# Patient Record
Sex: MALE | Race: WHITE | NOT HISPANIC OR LATINO | Employment: FULL TIME | URBAN - METROPOLITAN AREA
[De-identification: names, ages, dates, MRNs, and addresses within clinical notes are randomized per-mention and may not be internally consistent; named-entity substitution may affect disease eponyms.]

---

## 2021-04-08 DIAGNOSIS — Z23 ENCOUNTER FOR IMMUNIZATION: ICD-10-CM

## 2021-04-26 ENCOUNTER — IMMUNIZATIONS (OUTPATIENT)
Dept: FAMILY MEDICINE CLINIC | Facility: HOSPITAL | Age: 46
End: 2021-04-26

## 2021-04-26 DIAGNOSIS — Z23 ENCOUNTER FOR IMMUNIZATION: Primary | ICD-10-CM

## 2021-04-26 PROCEDURE — 0011A SARS-COV-2 / COVID-19 MRNA VACCINE (MODERNA) 100 MCG: CPT

## 2021-04-26 PROCEDURE — 91301 SARS-COV-2 / COVID-19 MRNA VACCINE (MODERNA) 100 MCG: CPT

## 2021-05-06 ENCOUNTER — OFFICE VISIT (OUTPATIENT)
Dept: FAMILY MEDICINE CLINIC | Facility: CLINIC | Age: 46
End: 2021-05-06
Payer: COMMERCIAL

## 2021-05-06 VITALS
OXYGEN SATURATION: 99 % | SYSTOLIC BLOOD PRESSURE: 128 MMHG | HEART RATE: 64 BPM | BODY MASS INDEX: 26.67 KG/M2 | HEIGHT: 68 IN | WEIGHT: 176 LBS | RESPIRATION RATE: 18 BRPM | DIASTOLIC BLOOD PRESSURE: 70 MMHG

## 2021-05-06 DIAGNOSIS — M54.12 CERVICAL RADICULOPATHY: Primary | ICD-10-CM

## 2021-05-06 DIAGNOSIS — M25.512 ACUTE PAIN OF LEFT SHOULDER: ICD-10-CM

## 2021-05-06 PROCEDURE — 99204 OFFICE O/P NEW MOD 45 MIN: CPT | Performed by: FAMILY MEDICINE

## 2021-05-06 PROCEDURE — 3725F SCREEN DEPRESSION PERFORMED: CPT | Performed by: FAMILY MEDICINE

## 2021-05-06 RX ORDER — METHYLPREDNISOLONE 4 MG/1
TABLET ORAL
Qty: 21 EACH | Refills: 0 | Status: SHIPPED | OUTPATIENT
Start: 2021-05-06 | End: 2021-06-01

## 2021-05-06 NOTE — PATIENT INSTRUCTIONS
Medrol dose quyen with food  Avoid taking motrin like products (ibuprofen) or aspirin while taking  Can still use CBD cream and take tylenol if needed for additional pain relief  Can try ice or heat - whichever provides more relief    Try to avoid a lot of overhead reaching/heavy lifting  Physical therapy  If not improving - will need to consider MRI    Return for full physical exam

## 2021-05-06 NOTE — PROGRESS NOTES
FAMILY PRACTICE OFFICE VISIT    NAME: Cayla Campbell    AGE: 54 y o  SEX: male  : 1975   MRN: [id number]    DATE: 2021  TIME: 8:59 AM    Assessment and Plan   1  Cervical radiculopathy  Suspect due to overuse/repetitive injuries over time  Trial of medrol dose quyen  Take with food  Avoid taking with nsaids or asa  Pt denies h/o GI bleed or ulcer  Avoid overhead lifting/excessive yardwork/shoveling,       2  Acute pain of left shoulder  Unsure if pain radiating from neck vs isolated shoulder issue  Suspect that pt may have element of underlying DJD   And may need to further address in future if not improving - with imaging/PT,    and possible steroid injections  Return for routine PE    PHQ-9 score of 8      AVS:  Medrol dose quyen with food  Avoid taking motrin like products (ibuprofen) or aspirin while taking  Can still use CBD cream and take tylenol if needed for additional pain relief  Can try ice or heat - whichever provides more relief  Try to avoid a lot of overhead reaching/heavy lifting  Physical therapy  If not improving - will need to consider MRI    Return for full physical exam      Chief Complaint     Chief Complaint   Patient presents with    Neck Pain       History of Present Illness   Cayla Campbell is a 54y o -year-old male who presents today as a new/'old' former patient of practice  Does not routinely see PCP  Here today for acute problem  Pt has neck pain - for a 'long time'   Works for an  and does a lot of overhead work  Also played ice hockey X 20 years  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    H/o recurrent head/neck injuries over the years   Occasionally takes motrin - taking 1 pill the last couple of days only;  and heating pad; also used some CBD cream which did help a little  Most recently - pt's neck flared up due to shoveling snow and then did settle a little    Also has left shoulder pain  Had covid vaccine into left deltoid last week - and that seemed to flare left upper arm/shoulder  Review of Systems   Review of Systems   Constitutional: Negative for fever  Respiratory: Negative for cough, shortness of breath and wheezing  Cardiovascular: Negative for chest pain and palpitations  Musculoskeletal: Positive for neck pain  Neck pain and pain in left upper arm/shoulder - feels like 'knots' in shoulder  Neck pain described as 'aching' and spasm     Neurological: Negative for dizziness  Right handed   Pt feels some weakness and tingling int left arm and into left 4-5th fingers  Pt does feel headaches related to neck pain         Active Problem List   There is no problem list on file for this patient  Past Medical History:  History reviewed  No pertinent past medical history  Past Surgical History:  History reviewed  No pertinent surgical history  Family History:  History reviewed  No pertinent family history  Social History:  Social History     Socioeconomic History    Marital status: /Civil Union     Spouse name: Not on file    Number of children: Not on file    Years of education: Not on file    Highest education level: Not on file   Occupational History    Not on file   Social Needs    Financial resource strain: Not on file    Food insecurity     Worry: Not on file     Inability: Not on file   [employer] needs     Medical: Not on file     Non-medical: Not on file   Tobacco Use    Smoking status: Former Smoker     Years: 10 00     Types: Cigarettes    Smokeless tobacco: Never Used   Substance and Sexual Activity    Alcohol use:  Yes    Drug use: Yes     Types: Marijuana    Sexual activity: Yes   Lifestyle    Physical activity     Days per week: Not on file     Minutes per session: Not on file    Stress: Not on file   Relationships    Social connections     Talks on phone: Not on file     Gets together: Not on file     Attends Confucianist service: Not on file     Active member of club or organization: Not on file     Attends meetings of clubs or organizations: Not on file     Relationship status: Not on file    Intimate partner violence     Fear of current or ex partner: Not on file     Emotionally abused: Not on file     Physically abused: Not on file     Forced sexual activity: Not on file   Other Topics Concern    Not on file   Social History Narrative    Not on file       Objective     Vitals:    05/06/21 0853   BP: 128/70   Pulse: 64   Resp: 18   SpO2: 99%     Wt Readings from Last 3 Encounters:   05/06/21 79 8 kg (176 lb)   10/29/14 77 6 kg (171 lb)   03/29/13 75 9 kg (167 lb 4 oz)       Physical Exam  Vitals signs and nursing note reviewed  Constitutional:       Appearance: Normal appearance  Eyes:      Extraocular Movements: Extraocular movements intact  Pupils: Pupils are equal, round, and reactive to light  Cardiovascular:      Rate and Rhythm: Normal rate and regular rhythm  Pulses: Normal pulses  Heart sounds: No murmur  Pulmonary:      Effort: Pulmonary effort is normal  No respiratory distress  Breath sounds: Normal breath sounds  No wheezing, rhonchi or rales  Musculoskeletal:      Comments: Decreased ROM cspine - rotation and sidebending due to discomfort  Slight increased muscle tension along left trapezius musculature  No asymmetry noted with shoulders/scapula  (-) empty can sign     Neurological:      General: No focal deficit present  Mental Status: He is alert and oriented to person, place, and time  Cranial Nerves: No cranial nerve deficit  Motor: No weakness  Comments: Sensory b/l upper ext's intact  Motor equal and normal (+)5/5 b/l upper ext's  (-) tinnels     Psychiatric:         Mood and Affect: Mood normal          Behavior: Behavior normal          Thought Content:  Thought content normal          Judgment: Judgment normal          Pertinent Laboratory/Diagnostic Studies:  No results found for: GLUCOSE, BUN, CREATININE, CALCIUM, NA, K, CO2, CL  No results found for: ALT, AST, GGT, ALKPHOS, BILITOT    No results found for: WBC, HGB, HCT, MCV, PLT    No results found for: TSH    No results found for: CHOL  No results found for: TRIG  No results found for: HDL  No results found for: LDLCALC  No results found for: HGBA1C    No results found for this or any previous visit  No orders of the defined types were placed in this encounter  ALLERGIES:  Allergies   Allergen Reactions    Bee Venom Hives       Current Medications     No current outpatient medications on file  No current facility-administered medications for this visit  Health Maintenance     Health Maintenance   Topic Date Due    HIV Screening  Never done    BMI: Followup Plan  Never done    Annual Physical  Never done    DTaP,Tdap,and Td Vaccines (1 - Tdap) Never done    COVID-19 Vaccine (2 - Moderna 2-dose series) 05/24/2021    Influenza Vaccine (Season Ended) 09/01/2021    Depression Screening PHQ  05/06/2022    BMI: Adult  05/06/2022    Pneumococcal Vaccine: Pediatrics (0 to 5 Years) and At-Risk Patients (6 to 59 Years)  Aged Out    HIB Vaccine  Aged Out    Hepatitis B Vaccine  Aged Out    IPV Vaccine  Aged Out    Hepatitis A Vaccine  Aged Out    Meningococcal ACWY Vaccine  Aged Out    HPV Vaccine  Aged [employer] History   Administered Date(s) Administered    SARS-CoV-2 / COVID-19 mRNA IM ([facility]) 04/26/2021     BMI Counseling: Body mass index is 26 76 kg/m²  The BMI is above normal  Nutrition recommendations include decreasing portion sizes, encouraging healthy choices of fruits and vegetables, decreasing fast food intake, consuming healthier snacks, limiting drinks that contain sugar, moderation in carbohydrate intake, increasing intake of lean protein, reducing intake of saturated and trans fat and reducing intake of cholesterol  Exercise recommendations include exercising 3-5 times per week  No pharmacotherapy was ordered  Marcos Carey, DO

## 2021-05-13 ENCOUNTER — EVALUATION (OUTPATIENT)
Dept: PHYSICAL THERAPY | Facility: MEDICAL CENTER | Age: 46
End: 2021-05-13
Payer: COMMERCIAL

## 2021-05-13 DIAGNOSIS — M54.12 CERVICAL RADICULOPATHY: ICD-10-CM

## 2021-05-13 DIAGNOSIS — M25.512 ACUTE PAIN OF LEFT SHOULDER: ICD-10-CM

## 2021-05-13 PROCEDURE — 97162 PT EVAL MOD COMPLEX 30 MIN: CPT | Performed by: PHYSICAL THERAPIST

## 2021-05-13 NOTE — PROGRESS NOTES
PT Evaluation     Today's date: 2021  Patient name: Genna Corona  : 1975  MRN: [id number]  Referring provider: Julio Cesar Mason DO  Dx:   Encounter Diagnosis     ICD-10-CM    1  Cervical radiculopathy  M54 12 Ambulatory referral to Physical Therapy   2  Acute pain of left shoulder  M25 512 Ambulatory referral to Physical Therapy                  Assessment/Plan  Patient is a very pleasant 53 yo male who presents with symptoms of pain and difficulty with cervical motion  Patient demonstrates hypomobility throughout cervical spine coupled with chronic whiplash injuries and poor motor control  Patient will benefit from skilled Physical therapy services to address issues of pain and return to normal function and higher level athletics (hockey)  Patient should be seen 2x a week for 4-6 weeks  Subjective  Patient states that he has been having issues with working overhead as he is an   He would like to play hockey again but his neck is always bothering him  Notes that things worsened this winter with snow shoveling and then weed whacking more recently  Pain feels like it is deep in the neck and clicking popping cracking sensations are irritating  Patient notes that he has had issues with his left shoulder as well  Has taking much abuse to his body over the years with hockey  Objective  Red Flags:none  Pain: 4-9/10  Reflexes: 2+BUE  Posture:poor posturing forward head rounded shoulders  Excessive upper cervical extension  AROM:liminted in all cervical motion by 50-65%  No motion recreated left arm pain  PROM: limited in all planes by 50%  PAROM: comparable sign with UPA of C2-5 left  Diminished motion throughout cervical spine B  Crepitus present   Palpation: TTP of cervical musculature on left      Special Tests: - distraction, - ULTT, - compression, + cervical flexion rotation test left, no changes in sensation  Coordination of Movement/strengthe:Patient demonstrates poor activation of Longus Capitus and Longus Coli with loss of feed forward mechanism with reaching tasks  Patient was able to perform activation with cueing  Goals  - Pt I with initial HEP in 1-2 visits  - Improve ROM equal to contralateral side in 4 weeks  - Improved Longus Coli and Longus Capitus activation and return of feed forward mechanism   - Increase Functional Status Measure measured by FOTO by [facility]  - return to overhead work with 75% less difficulty reportedly              Precautions: none

## 2021-05-18 ENCOUNTER — OFFICE VISIT (OUTPATIENT)
Dept: PHYSICAL THERAPY | Facility: MEDICAL CENTER | Age: 46
End: 2021-05-18
Payer: COMMERCIAL

## 2021-05-18 DIAGNOSIS — M25.512 ACUTE PAIN OF LEFT SHOULDER: ICD-10-CM

## 2021-05-18 DIAGNOSIS — M54.12 CERVICAL RADICULOPATHY: Primary | ICD-10-CM

## 2021-05-18 PROCEDURE — 97140 MANUAL THERAPY 1/> REGIONS: CPT | Performed by: PHYSICAL THERAPIST

## 2021-05-18 PROCEDURE — 97110 THERAPEUTIC EXERCISES: CPT | Performed by: PHYSICAL THERAPIST

## 2021-05-18 PROCEDURE — 97010 HOT OR COLD PACKS THERAPY: CPT | Performed by: PHYSICAL THERAPIST

## 2021-05-24 ENCOUNTER — OFFICE VISIT (OUTPATIENT)
Dept: PHYSICAL THERAPY | Facility: MEDICAL CENTER | Age: 46
End: 2021-05-24
Payer: COMMERCIAL

## 2021-05-24 DIAGNOSIS — M54.12 CERVICAL RADICULOPATHY: Primary | ICD-10-CM

## 2021-05-24 DIAGNOSIS — M25.512 ACUTE PAIN OF LEFT SHOULDER: ICD-10-CM

## 2021-05-24 PROCEDURE — 97140 MANUAL THERAPY 1/> REGIONS: CPT | Performed by: PHYSICAL THERAPIST

## 2021-05-24 PROCEDURE — 97010 HOT OR COLD PACKS THERAPY: CPT | Performed by: PHYSICAL THERAPIST

## 2021-05-24 NOTE — PROGRESS NOTES
Daily Note     Today's date: 2021  Patient name: Cayla Campebll  : 1975  MRN: [id number]  Referring provider: Davis Abernathy DO  Dx:   Encounter Diagnosis     ICD-10-CM    1  Cervical radiculopathy  M54 12    2  Acute pain of left shoulder  M25 512                   Subjective: patient states that he has been feeling worse and blames his activity level and work  Patient notes that his headaches and neck pain are limiting him in all his function and work  Objective: See treatment diary below      Assessment: patient was performing his HEP reportedly however the combination of his active job and trying to do his HEP has worsened his neck pain and headaches  Patient demonstrates restriction in movement in his cervical spine as well as poor posturing however symptoms are ambiguus  Patient's pain does not seem to match movement patterns and may not be orthopedic in nature  Patient should continue with treatment for the time being to see if change can be made  If not suggested that patient return to PCP for further work up  Plan: Continue per plan of care  Precautions: none  Daily Treatment Diary     Manual              C2-5 UPA left and right Gr  Iv -  10sec x5            C3-6 rotational mob left Gr   Iv-   10sec x5            STM upper trap 5 min                                          Exercise Diary              Heat supine 10min            pec stretch             No monnies             Shoulder extension             Scapular retractions             Horizontal abduction             Chin tucks             Thoracic extension over foam roller             Foam roller on wall

## 2021-05-28 ENCOUNTER — OFFICE VISIT (OUTPATIENT)
Dept: PHYSICAL THERAPY | Facility: MEDICAL CENTER | Age: 46
End: 2021-05-28
Payer: COMMERCIAL

## 2021-05-28 DIAGNOSIS — M54.12 CERVICAL RADICULOPATHY: Primary | ICD-10-CM

## 2021-05-28 DIAGNOSIS — M25.512 ACUTE PAIN OF LEFT SHOULDER: ICD-10-CM

## 2021-05-28 NOTE — PROGRESS NOTES
Patient arrived at clinic today reporting symptoms worsening again this week  Episodes of pain are up and down and symptoms seem to be more migrainus or even rheumatological in nature  Patient was instructed to return to PCP for follow up and further work up  Patient's symptoms do not seem to be orthopedic in nature and should be further evaluated  Patient stated that he will be compliant with following up with PCP  He was asked to return to update physical therapy on his progress  No visit today

## 2021-06-01 ENCOUNTER — OFFICE VISIT (OUTPATIENT)
Dept: FAMILY MEDICINE CLINIC | Facility: CLINIC | Age: 46
End: 2021-06-01
Payer: COMMERCIAL

## 2021-06-01 VITALS
SYSTOLIC BLOOD PRESSURE: 120 MMHG | RESPIRATION RATE: 18 BRPM | TEMPERATURE: 97.3 F | DIASTOLIC BLOOD PRESSURE: 82 MMHG | WEIGHT: 170 LBS | HEART RATE: 87 BPM | HEIGHT: 68 IN | OXYGEN SATURATION: 97 % | BODY MASS INDEX: 25.76 KG/M2

## 2021-06-01 DIAGNOSIS — M54.2 CERVICALGIA: ICD-10-CM

## 2021-06-01 DIAGNOSIS — M25.512 ACUTE PAIN OF LEFT SHOULDER: ICD-10-CM

## 2021-06-01 DIAGNOSIS — M54.12 CERVICAL RADICULOPATHY: Primary | ICD-10-CM

## 2021-06-01 PROCEDURE — 99214 OFFICE O/P EST MOD 30 MIN: CPT | Performed by: FAMILY MEDICINE

## 2021-06-01 PROCEDURE — 1036F TOBACCO NON-USER: CPT | Performed by: FAMILY MEDICINE

## 2021-06-01 PROCEDURE — 3008F BODY MASS INDEX DOCD: CPT | Performed by: FAMILY MEDICINE

## 2021-06-01 RX ORDER — ACETAMINOPHEN 325 MG/1
650 TABLET ORAL EVERY 6 HOURS PRN
COMMUNITY

## 2021-06-01 RX ORDER — METAXALONE 800 MG/1
TABLET ORAL
Qty: 15 TABLET | Refills: 0 | Status: SHIPPED | OUTPATIENT
Start: 2021-06-01 | End: 2022-02-11 | Stop reason: ALTCHOICE

## 2021-06-01 NOTE — PROGRESS NOTES
FAMILY PRACTICE OFFICE VISIT    NAME: Edilma Manuel    AGE: 54 y o  SEX: male  : 1975   MRN: [id number]    DATE: 2021  TIME: 9:54 AM    Assessment and Plan     1  Cervical radiculopathy  Given duration of pain and symptoms  And not responding to PT  As well as missed work due to symptoms - send for MRI c spine  To consider emg/ncs and possible physiatry pending results  May be contributing to headache  Note - pt took his wife's migraine med (?) - did not help  I discourage using other peoples meds  May need to conduct migraine workup  ED with pain severe  2  Cervicalgia  Suspect neck radiating pain into head causing headache  May have a muscular component  Trial of skelaxin - up to bid prn - avoid driving/working while taking due to sedation     Avoid using medical marijuana while taking due to possible adverse drug interactions  If pain severe - to ED  3  Acute pain of left shoulder  Suspect pain radiating from left side of neck      To consider rx something for anxiety at f/u visit  Will reassess how pt responds to muscle relaxant and review mri findings  Return in 6 weeks        Chief Complaint     Chief Complaint   Patient presents with    Neck Pain    Shoulder Pain    Migraine      Onset, 3 days of dull headache to migraine, after starting PT  Migraine recurrent   History of Present Illness   Edilma Manuel is a 54y o -year-old male who presents today in f/u to starting on PT  Going for neck and shoulder  He is not feeling any better  Was given medrol dose quyen at initial visit  PT felt that pt's cspine may be radiating into left shoulder  After the initial PT session - pt felt good relief  But since - has had a dull headache  Was given some home exercises  Went for 3-4 formal PT sessions      Pt has missed a # of work days due to not feeling well  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     Pt was asked by PT to return to pcp due to symptoms not seemingly orthopedic in nature  Possible migrainous or rheum as per PT  Pt describes headache as being in jaw and then over the entire head    Does use CBD cream and heating pad  Also using medicinal marijuana              Review of Systems   Review of Systems   Constitutional: Negative for fever  HENT:        Gets some TMJ discomfort     Respiratory: Negative for cough, shortness of breath and wheezing  Cardiovascular: Negative for chest pain and palpitations  Musculoskeletal: Positive for arthralgias, neck pain and neck stiffness  Neurological: Positive for weakness, numbness and headaches  Negative for dizziness and syncope  Some tingling returned into left 4th and 5th fingers  Twitches into left upper ext at times   Psychiatric/Behavioral: The patient is nervous/anxious  Pt's wife thinks that pt should discuss anxiety issues  Pt states he may have some anxiety       Active Problem List   There is no problem list on file for this patient  Past Medical History:  History reviewed  No pertinent past medical history  Past Surgical History:  History reviewed  No pertinent surgical history  Family History:  History reviewed  No pertinent family history  Social History:  Social History     Socioeconomic History    Marital status: /Civil Union     Spouse name: Not on file    Number of children: Not on file    Years of education: Not on file    Highest education level: Not on file   Occupational History    Not on file   Social Needs    Financial resource strain: Not on file    Food insecurity     Worry: Not on file     Inability: Not on file   [employer] needs     Medical: Not on file     Non-medical: Not on file   Tobacco Use    Smoking status: Former Smoker     Years: 10 00     Types: Cigarettes    Smokeless tobacco: Never Used   Substance and Sexual Activity    Alcohol use: Yes     Frequency: Monthly or less    Drug use:  Yes Types: Marijuana    Sexual activity: Yes   Lifestyle    Physical activity     Days per week: Not on file     Minutes per session: Not on file    Stress: Not on file   Relationships    Social connections     Talks on phone: Not on file     Gets together: Not on file     Attends Orthodoxy service: Not on file     Active member of club or organization: Not on file     Attends meetings of clubs or organizations: Not on file     Relationship status: Not on file    Intimate partner violence     Fear of current or ex partner: Not on file     Emotionally abused: Not on file     Physically abused: Not on file     Forced sexual activity: Not on file   Other Topics Concern    Not on file   Social History Narrative    Not on file       Objective     Vitals:    06/01/21 0926   BP: 120/82   Pulse: 87   Resp: 18   Temp: (!) 97 3 °F (36 3 °C)   SpO2: 97%     Wt Readings from Last 3 Encounters:   06/01/21 77 1 kg (170 lb)   05/06/21 79 8 kg (176 lb)   10/29/14 77 6 kg (171 lb)       Physical Exam  Vitals signs and nursing note reviewed  Constitutional:       General: He is not in acute distress  Appearance: Normal appearance  He is not ill-appearing or toxic-appearing  Eyes:      General: No scleral icterus  Cardiovascular:      Rate and Rhythm: Normal rate and regular rhythm  Musculoskeletal:      Comments: Pt looks like he is afraid to move his neck around  He admits that he does not want things to 'spaz out'  Some discomfort in c-spine with sidebending and rotation  Also - discomfort with extension of cspine  Increased muscle tension - in left upper paraspinal muscles - left sided and extending into left shoulder /trapezius region  Neurological:      General: No focal deficit present  Mental Status: He is alert  Cranial Nerves: No cranial nerve deficit  Sensory: No sensory deficit        Deep Tendon Reflexes: Reflexes normal       Comments: (-) spurlings compression sign  No weakness in either upper ext against resistence  Normal sensation in b/l upper ext's  Reflexes (+) 2/4 b/l upper ext's     Psychiatric:         Thought Content: Thought content normal          Judgment: Judgment normal       Comments: Pt required frequent re-directioning with questioning         Pertinent Laboratory/Diagnostic Studies:  No results found for: GLUCOSE, BUN, CREATININE, CALCIUM, NA, K, CO2, CL  No results found for: ALT, AST, GGT, ALKPHOS, BILITOT    No results found for: WBC, HGB, HCT, MCV, PLT    No results found for: TSH    No results found for: CHOL  No results found for: TRIG  No results found for: HDL  No results found for: LDLCALC  No results found for: HGBA1C    No results found for this or any previous visit  No orders of the defined types were placed in this encounter  ALLERGIES:  Allergies   Allergen Reactions    Bee Venom Hives       Current Medications     Current Outpatient Medications   Medication Sig Dispense Refill    acetaminophen (TYLENOL) 325 mg tablet Take 650 mg by mouth every 6 (six) hours as needed for mild pain      methylPREDNISolone 4 MG tablet therapy pack Use as directed on package; take with food; avoid taking with nsaids or asa (Patient not taking: Reported on 6/1/2021) 21 each 0     No current facility-administered medications for this visit            Health Maintenance     Health Maintenance   Topic Date Due    HIV Screening  Never done    Annual Physical  Never done    DTaP,Tdap,and Td Vaccines (1 - Tdap) Never done    COVID-19 Vaccine (2 - Moderna 2-dose series) 05/24/2021    Influenza Vaccine (Season Ended) 09/01/2021    Depression Screening PHQ  05/06/2022    BMI: Followup Plan  05/06/2022    BMI: Adult  06/01/2022    Pneumococcal Vaccine: Pediatrics (0 to 5 Years) and At-Risk Patients (6 to 59 Years)  Aged Out    HIB Vaccine  Aged Out    Hepatitis B Vaccine  Aged Out    IPV Vaccine  Aged Out    Hepatitis A Vaccine  Aged Out    Meningococcal ACWY Vaccine  Aged Out    HPV Vaccine  Aged Out     Immunization History   Administered Date(s) Administered    SARS-CoV-2 / COVID-19 mRNA IM (Moderna) 04/26/2021          Amrit Camp DO

## 2021-06-02 ENCOUNTER — IMMUNIZATIONS (OUTPATIENT)
Dept: FAMILY MEDICINE CLINIC | Facility: HOSPITAL | Age: 46
End: 2021-06-02

## 2021-06-02 DIAGNOSIS — Z23 ENCOUNTER FOR IMMUNIZATION: Primary | ICD-10-CM

## 2021-06-02 PROCEDURE — 0012A SARS-COV-2 / COVID-19 MRNA VACCINE (MODERNA) 100 MCG: CPT

## 2021-06-02 PROCEDURE — 91301 SARS-COV-2 / COVID-19 MRNA VACCINE (MODERNA) 100 MCG: CPT

## 2021-06-04 ENCOUNTER — AMB VIDEO VISIT (OUTPATIENT)
Dept: URGENT CARE | Facility: CLINIC | Age: 46
End: 2021-06-04

## 2021-06-04 DIAGNOSIS — R10.31 RIGHT INGUINAL PAIN: Primary | ICD-10-CM

## 2021-06-04 DIAGNOSIS — R19.09 LUMP IN THE GROIN: ICD-10-CM

## 2021-06-04 NOTE — PROGRESS NOTES
TeleConsultation - Tulio Gillespie 54 y o  male MRN: [id number]     Encounter: [id number]    REQUIRED DOCUMENTATION:     1  This service was provided via Telemedicine  2  Provider located at [facility] care now  3  TeleMed provider: Rafael Harley PA-C   4  Identify all parties in room with patient during tele consult:  Patient only  5  After connecting through [employer], patient was identified by name and date of birth and confirmed patient was located in [place]  Patient was then informed that this was a Telemedicine visit and that the exam was being conducted confidentially over secure lines  My office door was closed  No one else was in the room  Patient acknowledged consent and understanding of privacy and security of the Telemedicine visit  Patient presented the opportunity for them to ask any questions regarding the visit today  The patient agreed to participate  [address] Now      NAME: Tulio Gillespie is a 54 y o  male  : 1975    MRN: [id number]  DATE: 2021  TIME: 2:52 PM    Assessment and Plan   Right inguinal pain [R10 31]  1  Right inguinal pain     2  Lump in the groin         Patient Instructions   Educated possible lymph node vs hernia  Patient to follow up with PCP in 2-3 days for physcial evaluation  Patient agreeable to plan   To present to the ER if symptoms worsen  Chief Complaint   No chief complaint on file  History of Present Illness   Tulio Gillespie presents to the video visit c/o    Patient reports he got his second covid vaccine yesterday (Balinda Cera) in his R buttock  He woke up today with a lump in his right groin that is mildly tender to palpation  He reports a small lump in his left groin area as well  Denies any recent trauma or heavy lifting  No erythema or warmth  No drainage out of the lump  Review of Systems   Review of Systems   Constitutional: Negative for chills, fatigue and fever  Respiratory: Negative for cough and shortness of breath  Cardiovascular: Negative for chest pain  Gastrointestinal: Negative for abdominal pain, diarrhea, nausea and vomiting  Genitourinary: Negative for dysuria, testicular pain and urgency  Skin: Negative for color change  Neurological: Negative for dizziness  Hematological: Positive for adenopathy (possible lumps in b/l groins R>L)  Current Medications     Long-Term Medications   Medication Sig Dispense Refill    metaxalone (SKELAXIN) 800 mg tablet Take 1/2 to 1 tab po up to bid prn muscle spasm - may cause drowsiness; avoid driving/working while taking; do not take with medical marijuana 15 tablet 0       Current Allergies     Allergies as of 06/04/2021 - Reviewed 06/01/2021   Allergen Reaction Noted    Bee venom Hives 02/28/2013            The following portions of the patient's history were reviewed and updated as appropriate: allergies, current medications, past family history, past medical history, past social history, past surgical history and problem list   No past medical history on file  No past surgical history on file    Social History     Socioeconomic History    Marital status: /Civil Union     Spouse name: Not on file    Number of children: Not on file    Years of education: Not on file    Highest education level: Not on file   Occupational History    Not on file   Social Needs    Financial resource strain: Not on file    Food insecurity     Worry: Not on file     Inability: Not on file   [employer] needs     Medical: Not on file     Non-medical: Not on file   Tobacco Use    Smoking status: Former Smoker     Years: 10 00     Types: Cigarettes    Smokeless tobacco: Never Used   Substance and Sexual Activity    Alcohol use: Yes     Frequency: Monthly or less    Drug use: Yes     Types: Marijuana    Sexual activity: Yes   Lifestyle    Physical activity     Days per week: Not on file     Minutes per session: Not on file    Stress: Not on file   Relationships    Social connections     Talks on phone: Not on file     Gets together: Not on file     Attends Church service: Not on file     Active member of club or organization: Not on file     Attends meetings of clubs or organizations: Not on file     Relationship status: Not on file    Intimate partner violence     Fear of current or ex partner: Not on file     Emotionally abused: Not on file     Physically abused: Not on file     Forced sexual activity: Not on file   Other Topics Concern    Not on file   Social History Narrative    Not on file       Objective   There were no vitals taken for this visit    video visit- per patient no fevers  Physical Exam     Physical Exam  Constitutional:       General: He is not in acute distress  Appearance: He is not toxic-appearing  HENT:      Right Ear: External ear normal       Left Ear: External ear normal    Eyes:      Conjunctiva/sclera: Conjunctivae normal    Pulmonary:      Effort: Pulmonary effort is normal    Neurological:      Mental Status: He is alert  Psychiatric:         Mood and Affect: Mood normal          Thought Content:  Thought content normal        Video visit- NAD  Patient reporting a small lump in r and l groin R>L with mild tenderness to palpation, no erythema or warmth, no drainage, no abdominal pain    Celina Garcia PA-C

## 2021-06-06 ENCOUNTER — HOSPITAL ENCOUNTER (OUTPATIENT)
Dept: MRI IMAGING | Facility: HOSPITAL | Age: 46
Discharge: HOME | End: 2021-06-06
Attending: FAMILY MEDICINE
Payer: COMMERCIAL

## 2021-06-06 DIAGNOSIS — M54.12 CERVICAL RADICULOPATHY: ICD-10-CM

## 2021-06-06 DIAGNOSIS — M54.2 CERVICALGIA: ICD-10-CM

## 2021-06-06 PROCEDURE — 72141 MRI NECK SPINE W/O DYE: CPT

## 2021-06-06 PROCEDURE — G1004 CDSM NDSC: HCPCS

## 2021-06-11 NOTE — RESULT ENCOUNTER NOTE
Please call pt - with results of cervical spine MRI:  Multiple levels of arthritic changes noted causing some narrowing around nerve roots    Would recommend given level of pain - appt with spine center  And to hold off on further physical therapy for now

## 2021-06-16 ENCOUNTER — TELEPHONE (OUTPATIENT)
Dept: PHYSICAL THERAPY | Facility: OTHER | Age: 46
End: 2021-06-16

## 2021-06-16 NOTE — TELEPHONE ENCOUNTER
Call placed to the patient per [facility] Sp[facility] Program referral and VM left 6/16/21  Voice message left for patient to call back  Phone number and hours of business provided  This is the 1st attempt to reach the patient  Will defer per protocol

## 2021-06-17 ENCOUNTER — NURSE TRIAGE (OUTPATIENT)
Dept: PHYSICAL THERAPY | Facility: OTHER | Age: 46
End: 2021-06-17

## 2021-06-17 DIAGNOSIS — G89.29 CHRONIC NECK PAIN: Primary | ICD-10-CM

## 2021-06-17 DIAGNOSIS — M54.2 CHRONIC NECK PAIN: Primary | ICD-10-CM

## 2021-06-17 NOTE — TELEPHONE ENCOUNTER
Additional Information   Negative: Is this related to a work injury?  Negative: Is this related to an MVA?  Negative: Are you currently recieving homecare services?  Negative: Does the patient have a fever?  Negative: Has the patient had unexplained weight loss?  Negative: Is the patient experiencing blood in sputum?  Negative: Has the patient experienced major trauma? (fall from height, high speed collision, direct blow to spine) and is also experiencing nausea, light-headedness, or loss of consciousness?  Negative: Is the patient experiencing urine retention?  Negative: Is the patient experiencing acute drop foot or paralysis?  Affirmative: Is this a chronic condition? Background - Initial Assessment  Clinical complaint: "pretty much my whole neck"- Left side and posterior neck hurt worse- Radiates to Left shoulder area  Date of onset: started about 10 yrs ago- NKI- Intermittent episodes- Worsened in Winter/Feb after shoveling snow- acute worsening over past few months  Frequency of pain: intermittent  Quality of pain: dull ache, shooting, sharp    Protocols used: [facility] COMPREHENSIVE SPINE PROGRAM PROTOCOL    Patient LM for [facility] requesting CB  Patient seen by Shannan Mcdowell Several times for PT eval and sessions  PT ended based on pts response and wish to end  PLEASE SEE ALL NOTES  PT TO F/U WITH PCP  Patient was called by her office and MRI results reviewed  Referral to Comp Spine entered  Patient had lengthy discussion about his negative experiences/ineffective results with PT and PCP office  Patient was not seen (or make appointment) w/ PCP and stated he could not understand the imaging results in [address]  Nurse encouraged pt to reach out to PCP using My/chart to address and let them know [facility] does NOT make/set up appointments  Patient was pleasant, but complained about the need to "jump through hoops and thought you would just schedule appointment for me"    Patient also voiced his frustration regarding triage as he said, " well you have my chart and I answered all this before"  Nurse explained that we spent a lot of time discussing what the best course of action should be - the only other appropriate referral would be for Spine & Pain  Nurse let him know a TRIAGE/Questions are done with all patients who wish to move forward with eval and/or treatment for their pain issues  Patients neck pain appears to be chronic with intermittent acute worsening  Encouraged to contact PCP about concerns and pt asked nurse, "why do I need to call her  She's the one who gave me this number to set up appt"  Nurse told him he is not required and do not contact as suggested  HE had questions for his PCP that he addressed today with [facility]  Nurse also informed him chart will be noted  Nurse stated she hopes referral will assist him with scheduling PM appointment  Patient thanked nurse and referral closed

## 2021-06-30 NOTE — CARE ANYWHERE EVISITS
Visit Summary for Elvia Kaur - Gender: Male - Date of Birth: [id number]  Date: [id number] - Duration: 13 minutes  Patient: Elvia Kaur  Provider: Carlo Douglas PA-C    Patient Contact Information  Address  [address] [facility]; [address]  [phone]    Visit Topics    Triage Questions   What is your current physical address in the event of a medical emergency? Answer []  Are you allergic to any medications? Answer []  Are you now or could you be pregnant? Answer []  Do you have any immune system compromise or chronic lung   disease? Answer []  Do you have any vulnerable family members in the home (infant, pregnant, cancer, elderly)? Answer []     Conversation Transcripts  [0A][0A] [Notification] You are connected with Carlo Douglas PA-C, Urgent Care Specialist [0A][Notification] Vicki Clements is located in [place]  [0A][Notification] Vicki Clements has shared health history  Jabari Decker  [0A]    Diagnosis    Procedures  Value: [id number] Code: CPT-4 UNLISTED E&M SERVICE    Medications Prescribed    No prescriptions ordered    Electronically signed by: Jamila Mayes(NPI [id number])

## 2021-07-08 ENCOUNTER — CONSULT (OUTPATIENT)
Dept: PAIN MEDICINE | Facility: MEDICAL CENTER | Age: 46
End: 2021-07-08
Payer: COMMERCIAL

## 2021-07-08 VITALS
HEIGHT: 68 IN | BODY MASS INDEX: 26.07 KG/M2 | HEART RATE: 68 BPM | SYSTOLIC BLOOD PRESSURE: 135 MMHG | WEIGHT: 172 LBS | DIASTOLIC BLOOD PRESSURE: 86 MMHG | TEMPERATURE: 97.6 F

## 2021-07-08 DIAGNOSIS — M54.2 CHRONIC NECK PAIN: ICD-10-CM

## 2021-07-08 DIAGNOSIS — G89.29 CHRONIC NECK PAIN: ICD-10-CM

## 2021-07-08 DIAGNOSIS — M47.812 CERVICAL SPONDYLOSIS: Primary | ICD-10-CM

## 2021-07-08 PROCEDURE — 99244 OFF/OP CNSLTJ NEW/EST MOD 40: CPT | Performed by: PHYSICAL MEDICINE & REHABILITATION

## 2021-07-08 RX ORDER — IBUPROFEN 200 MG
400 TABLET ORAL EVERY 6 HOURS PRN
COMMUNITY
End: 2022-02-11 | Stop reason: ALTCHOICE

## 2021-07-08 NOTE — PATIENT INSTRUCTIONS
Arthritis   WHAT YOU NEED TO KNOW:   Arthritis is pain or disease in one or more joints  There are many types of arthritis  Types such as rheumatoid arthritis cause inflammation in the joints  Other types wear away the cartilage between joints, such as osteoarthritis  This makes the bones of the joint rub together when you move the joint  An infection from bacteria, a virus, or a fungus can also cause arthritis  Your symptoms may be constant, or symptoms may come and go  Arthritis often gets worse over time and can cause permanent joint damage  DISCHARGE INSTRUCTIONS:   Call your doctor or rheumatologist if:   · You have a fever and severe joint pain or swelling  · You cannot move the affected joint  · You have severe joint pain you cannot tolerate  · You have a new or worsening rash  · Your pain or swelling does not get better with treatment  · You have questions or concerns about your condition or care  Medicines:   · Acetaminophen  decreases pain and fever  It is available without a doctor's order  Ask how much to take and how often to take it  Follow directions  Read the labels of all other medicines you are using to see if they also contain acetaminophen, or ask your doctor or pharmacist  Acetaminophen can cause liver damage if not taken correctly  Do not use more than 4 grams (4,000 milligrams) total of acetaminophen in one day  · NSAIDs , such as ibuprofen, help decrease swelling, pain, and fever  This medicine is available with or without a doctor's order  NSAIDs can cause stomach bleeding or kidney problems in certain people  If you take blood thinner medicine, always ask your healthcare provider if NSAIDs are safe for you  Always read the medicine label and follow directions  · Steroids  reduce swelling and pain  · Prescription pain medicine  may be given  Ask your healthcare provider how to take this medicine safely  Some prescription pain medicines contain acetaminophen   Do not take other medicines that contain acetaminophen without talking to your healthcare provider  Too much acetaminophen may cause liver damage  Prescription pain medicine may cause constipation  Ask your healthcare provider how to prevent or treat constipation  · Take your medicine as directed  Contact your healthcare provider if you think your medicine is not helping or if you have side effects  Tell him of her if you are allergic to any medicine  Keep a list of the medicines, vitamins, and herbs you take  Include the amounts, and when and why you take them  Bring the list or the pill bottles to follow-up visits  Carry your medicine list with you in case of an emergency  Manage your symptoms:   · Rest your painful joint so it can heal   Your healthcare provider may recommend crutches or a walker if the affected joint is in a leg  · Apply ice or heat to the joint  Both can help decrease swelling and pain  Ice may also help prevent tissue damage  Use an ice pack, or put crushed ice in a plastic bag  Cover it with a towel and place it on your joint for 15 to 20 minutes every hour or as directed  You can apply heat for 20 minutes every 2 hours  Heat treatment includes hot packs or heat lamps  · Elevate your joint  Elevation helps reduce swelling and pain  Raise your joint above the level of your heart as often as you can  Prop your painful joint on pillows to keep it above your heart comfortably  Manage arthritis:   · Talk to your healthcare providers about your arthritis medicines  Some medicines may only be needed when you have arthritis pain  You may need to take other medicines every day to prevent arthritis from getting worse  Your healthcare providers will help you understand all your medicines and when to take them  It is important to take the medicines as directed, even if you start to feel better  You can continue to have joint damage and inflammation even if you do not feel it      · Eat a variety of healthy foods  Healthy foods include fruits, vegetables, whole-grain breads, low-fat dairy products, beans, lean meats, and fish  Ask if you need to be on a special diet  A diet rich in calcium and vitamin D may decrease your risk of osteoporosis  Foods high in calcium include milk, cheese, broccoli, and tofu  Vitamin D may be found in meat, fish, fortified milk, cereal and bread  Ask if you need calcium or vitamin D supplements  · Go to physical or occupational therapy as directed  A physical therapist can teach you exercises to improve flexibility and range of motion  You may also be shown non-weight-bearing exercises that are safe for your joints, such as swimming  Exercise can help keep your joints flexible and reduce pain  An occupational therapist can help you learn to do your daily activities when your joints are stiff or sore  · Maintain a healthy weight  Extra weight puts increased pressure on your joints  Ask your healthcare provider what you should weigh  If you need to lose weight, he or she can help you create a weight loss program  Weight loss can help reduce pain and increase your ability to do your activities  · Wear flat or low-heeled shoes  This will help decrease pain and reduce pressure on your ankle, knee, and hip joints  · Do not smoke  Nicotine and other chemicals in cigarettes and cigars can damage your bones and joints  Ask your healthcare provider for information if you currently smoke and need help to quit  E-cigarettes or smokeless tobacco still contain nicotine  Talk to your healthcare provider before you use these products  Support devices:   · Orthotic shoes or insoles  help support your feet when you walk  · Crutches, a cane, or a walker  may help decrease your risk for falling  They also decrease stress on affected joints  · Devices to prevent falls  include raised toilet seats and bathtub bars to help you get up from sitting   Handrails can be placed in areas where you need balance and support  · Devices to help with support and rest  include splints to wear on your hands and a firm pillow while you sleep  Use a pillow that is firm enough to support your neck and head  Follow up with your healthcare provider or rheumatologist as directed:  Write down your questions so you remember to ask them during your visits  © Copyright [address] Information is for End User's use only and may not be sold, redistributed or otherwise used for commercial purposes  All illustrations and images included in CareNotes® are the copyrighted property of [employer]  or [address] [place], [place] [address]   The above information is an  only  It is not intended as medical advice for individual conditions or treatments  Talk to your doctor, nurse or pharmacist before following any medical regimen to see if it is safe and effective for you

## 2021-07-15 ENCOUNTER — OFFICE VISIT (OUTPATIENT)
Dept: FAMILY MEDICINE CLINIC | Facility: CLINIC | Age: 46
End: 2021-07-15
Payer: COMMERCIAL

## 2021-07-15 VITALS
DIASTOLIC BLOOD PRESSURE: 88 MMHG | HEIGHT: 68 IN | SYSTOLIC BLOOD PRESSURE: 122 MMHG | WEIGHT: 172 LBS | OXYGEN SATURATION: 99 % | HEART RATE: 75 BPM | BODY MASS INDEX: 26.07 KG/M2

## 2021-07-15 DIAGNOSIS — Z23 NEED FOR TETANUS BOOSTER: ICD-10-CM

## 2021-07-15 DIAGNOSIS — F41.9 ANXIETY AND DEPRESSION: ICD-10-CM

## 2021-07-15 DIAGNOSIS — F32.A ANXIETY AND DEPRESSION: ICD-10-CM

## 2021-07-15 DIAGNOSIS — M54.12 CERVICAL RADICULOPATHY: Primary | ICD-10-CM

## 2021-07-15 PROCEDURE — 99214 OFFICE O/P EST MOD 30 MIN: CPT | Performed by: FAMILY MEDICINE

## 2021-07-15 PROCEDURE — 3008F BODY MASS INDEX DOCD: CPT | Performed by: FAMILY MEDICINE

## 2021-07-15 PROCEDURE — 1036F TOBACCO NON-USER: CPT | Performed by: FAMILY MEDICINE

## 2021-07-15 RX ORDER — VENLAFAXINE HYDROCHLORIDE 37.5 MG/1
37.5 CAPSULE, EXTENDED RELEASE ORAL
Qty: 60 CAPSULE | Refills: 1 | Status: SHIPPED | OUTPATIENT
Start: 2021-07-15 | End: 2021-10-21 | Stop reason: SDUPTHER

## 2021-07-15 NOTE — PATIENT INSTRUCTIONS
Begin taking effexor - 1 a day for 2 weeks, then increase to 2 a day  Take with food in the am    Consider counseling    Return in 4-6 weeks

## 2021-07-15 NOTE — PROGRESS NOTES
FAMILY PRACTICE OFFICE VISIT    NAME: Meghann Molina    AGE: 51 y o  SEX: male  : 1975   MRN: [id number]    DATE: 7/15/2021  TIME: 9:04 AM    Assessment and Plan   1  Need for tetanus booster  See below    - TDAP VACCINE GREATER THAN OR EQUAL TO 8YO IM    2  Cervical radiculopathy  Under care of spine center  And scheduled for injections    3  Anxiety/depression  Will begin treatment with effexor   Discussed that he should not take with skelaxin and should limit nsaid use  Combinations can increase risk of serotonin syndrome and GI bleed respectively;  Caution to avoid taking medical marijuana - with effexor as possible adverse drug interactions  Note - pt has not been taking skelaxin - so removed from med list     Discussed onset of action and possible adr's of effexor  Avoid abrupt discontinuation  Advise pt to consider counseling  shortterm f/u in 4 - 6 weeks - and will check bp as well  Baseline today is stable    Will hold off on giving adacel today as pt is going to be getting injections thru spine center soon  There are no Patient Instructions on file for this visit  Patient Instructions   Begin taking effexor - 1 a day for 2 weeks, then increase to 2 a day  Take with food in the am    Consider counseling    Return in 4-6 weeks                Chief Complaint     Chief Complaint   Patient presents with    Follow-up       History of Present Illness   Meghann Molina is a 51y o -year-old male who presents today in followup to cervical radiculopathy  He has not taken many of the skelaxin as he noticed motrin was working better for him  He is taking motrin prn - but not regularly - may take 400 mg at a time and up to bid  He went to the spine center  And is set up for injections with ultimate goal for possible ablation and then NAGA if needed  Review of Systems   Review of Systems   Constitutional: Negative for fever  Respiratory: Negative for cough, shortness of breath and wheezing  Cardiovascular: Negative for chest pain and palpitations  Musculoskeletal: Positive for neck pain  Neurological:        Radiculopathy into left upper ext  Psychiatric/Behavioral: Positive for dysphoric mood  The patient is nervous/anxious  Pt feels that he is finn - particularly upon awakening in the am  Sometimes does not want to leave the house and do things  [place] like he was having panic attacks  Pt is interested in starting medication  Pt states that his wife wants him to consider medication as well  Pt has a lot of stress at work        Active Problem List   There is no problem list on file for this patient  Past Medical History:  Past Medical History:   Diagnosis Date    Anxiety     Arthritis     Neck pain        Past Surgical History:  Past Surgical History:   Procedure Laterality Date    NO PAST SURGERIES         Family History:  Family History   Problem Relation Age of Onset    No Known Problems Mother     No Known Problems Father        Social History:  Social History     Socioeconomic History    Marital status: /Civil Union     Spouse name: Not on file    Number of children: Not on file    Years of education: Not on file    Highest education level: Not on file   Occupational History    Not on file   Tobacco Use    Smoking status: Former Smoker     Years: 10 00     Types: Cigarettes    Smokeless tobacco: Never Used   Vaping Use    Vaping Use: Every day    Substances: THC, CBD   Substance and Sexual Activity    Alcohol use:  Yes    Drug use: Yes     Types: Marijuana     Comment: medical    Sexual activity: Yes   Other Topics Concern    Not on file   Social History Narrative    Not on file     Social Determinants of Health     Financial Resource Strain:     Difficulty of Paying Living Expenses:    Food Insecurity:     Worried About Running Out of Food in the Last Year:     [address] in the Last Year:    Transportation Needs:     Lack of Transportation (Medical):  Lack of Transportation (Non-Medical):    Physical Activity:     Days of Exercise per Week:     Minutes of Exercise per Session:    Stress:     Feeling of Stress :    Social Connections:     Frequency of Communication with Friends and Family:     Frequency of Social Gatherings with Friends and Family:     Attends [employer] Services:     Active Member of Clubs or Organizations:     Attends Club or Organization Meetings:     Marital Status:    Intimate Partner Violence:     Fear of Current or Ex-Partner:     Emotionally Abused:     Physically Abused:     Sexually Abused:        Objective     Vitals:    07/15/21 0900   BP: 122/88   Pulse: 75   SpO2: 99%     Wt Readings from Last 3 Encounters:   07/15/21 78 kg (172 lb)   07/08/21 78 kg (172 lb)   06/01/21 77 1 kg (170 lb)       Physical Exam  Vitals and nursing note reviewed  Constitutional:       General: He is not in acute distress  Appearance: Normal appearance  He is not ill-appearing or toxic-appearing  Cardiovascular:      Rate and Rhythm: Normal rate and regular rhythm  Pulses: Normal pulses  Heart sounds: Normal heart sounds  No murmur heard  Pulmonary:      Effort: Pulmonary effort is normal  No respiratory distress  Breath sounds: Normal breath sounds  No stridor  No wheezing, rhonchi or rales  Neurological:      General: No focal deficit present  Mental Status: He is alert and oriented to person, place, and time  Psychiatric:         Mood and Affect: Mood normal          Behavior: Behavior normal          Thought Content:  Thought content normal          Judgment: Judgment normal          Pertinent Laboratory/Diagnostic Studies:  No results found for: GLUCOSE, BUN, CREATININE, CALCIUM, NA, K, CO2, CL  No results found for: ALT, AST, GGT, ALKPHOS, BILITOT    No results found for: WBC, HGB, HCT, MCV, PLT    No results found for: TSH    No results found for: CHOL  No results found for: TRIG  No results found for: HDL  No results found for: LDLCALC  No results found for: HGBA1C    No results found for this or any previous visit  No orders of the defined types were placed in this encounter  ALLERGIES:  Allergies   Allergen Reactions    Bee Venom Hives       Current Medications     Current Outpatient Medications   Medication Sig Dispense Refill    acetaminophen (TYLENOL) 325 mg tablet Take 650 mg by mouth every 6 (six) hours as needed for mild pain       ibuprofen (MOTRIN) 200 mg tablet Take 400 mg by mouth every 6 (six) hours as needed for mild pain      metaxalone (SKELAXIN) 800 mg tablet Take 1/2 to 1 tab po up to bid prn muscle spasm - may cause drowsiness; avoid driving/working while taking; do not take with medical marijuana 15 tablet 0     No current facility-administered medications for this visit           Health Maintenance     Health Maintenance   Topic Date Due    Hepatitis C Screening  Never done    HIV Screening  Never done    Annual Physical  Never done    DTaP,Tdap,and Td Vaccines (1 - Tdap) Never done    Influenza Vaccine (1) 09/01/2021    Depression Screening PHQ  05/06/2022    BMI: Followup Plan  05/06/2022    BMI: Adult  07/15/2022    COVID-19 Vaccine  Completed    Pneumococcal Vaccine: Pediatrics (0 to 5 Years) and At-Risk Patients (6 to 59 Years)  Aged Out    HIB Vaccine  Aged Out    Hepatitis B Vaccine  Aged Out    IPV Vaccine  Aged Out    Hepatitis A Vaccine  Aged Out    Meningococcal ACWY Vaccine  Aged Out    HPV Vaccine  Aged [employer] History   Administered Date(s) Administered    SARS-CoV-2 / COVID-19 mRNA IM ([facility]) 04/26/2021, 06/02/2021          Rochelle Shelby DO

## 2021-08-25 ENCOUNTER — HOSPITAL ENCOUNTER (OUTPATIENT)
Dept: RADIOLOGY | Facility: MEDICAL CENTER | Age: 46
Discharge: HOME | End: 2021-08-25
Attending: PHYSICAL MEDICINE & REHABILITATION | Admitting: PHYSICAL MEDICINE & REHABILITATION
Payer: COMMERCIAL

## 2021-08-25 VITALS
HEART RATE: 100 BPM | TEMPERATURE: 97.4 F | RESPIRATION RATE: 20 BRPM | OXYGEN SATURATION: 100 % | DIASTOLIC BLOOD PRESSURE: 91 MMHG | SYSTOLIC BLOOD PRESSURE: 136 MMHG

## 2021-08-25 DIAGNOSIS — M54.2 CERVICALGIA: ICD-10-CM

## 2021-08-25 PROCEDURE — 62321 NJX INTERLAMINAR CRV/THRC: CPT | Performed by: PHYSICAL MEDICINE & REHABILITATION

## 2021-08-25 RX ORDER — METHYLPREDNISOLONE ACETATE 80 MG/ML
80 INJECTION, SUSPENSION INTRA-ARTICULAR; INTRALESIONAL; INTRAMUSCULAR; PARENTERAL; SOFT TISSUE ONCE
Status: COMPLETED | OUTPATIENT
Start: 2021-08-25 | End: 2021-08-25

## 2021-08-25 RX ADMIN — METHYLPREDNISOLONE ACETATE 80 MG: 80 INJECTION, SUSPENSION INTRA-ARTICULAR; INTRALESIONAL; INTRAMUSCULAR; PARENTERAL; SOFT TISSUE at 08:28

## 2021-08-25 RX ADMIN — IOHEXOL 1 ML: 300 INJECTION, SOLUTION INTRAVENOUS at 08:28

## 2021-08-25 NOTE — H&P
History of Present Illness: The patient is a 51 y o  male who presents with complaints of neck and arm pain    Patient Active Problem List   Diagnosis    Cervical radiculopathy       Past Medical History:   Diagnosis Date    Anxiety     Arthritis     Neck pain        Past Surgical History:   Procedure Laterality Date    NO PAST SURGERIES           Current Outpatient Medications:     acetaminophen (TYLENOL) 325 mg tablet, Take 650 mg by mouth every 6 (six) hours as needed for mild pain , Disp: , Rfl:     ibuprofen (MOTRIN) 200 mg tablet, Take 400 mg by mouth every 6 (six) hours as needed for mild pain, Disp: , Rfl:     metaxalone (SKELAXIN) 800 mg tablet, Take 1/2 to 1 tab po up to bid prn muscle spasm - may cause drowsiness; avoid driving/working while taking; do not take with medical marijuana, Disp: 15 tablet, Rfl: 0    venlafaxine (EFFEXOR-XR) 37 5 mg 24 hr capsule, Take 1 capsule (37 5 mg total) by mouth daily with breakfast For 2 weeks, then increase to 2 tabs daily with breakfast; do not take with skelaxin; caution with medical marijuana, Disp: 60 capsule, Rfl: 1    Current Facility-Administered Medications:     iohexol (OMNIPAQUE) 300 mg/mL injection 50 mL, 50 mL, Epidural, Once, Scottie Locke, DO    methylPREDNISolone acetate (DEPO-MEDROL) injection 80 mg, 80 mg, Epidural, Once, Scottie Locke, DO    Allergies   Allergen Reactions    Bee Venom Hives       Physical Exam:   Vitals:    08/25/21 0818   BP: 153/83   Pulse: 70   Resp: 20   Temp: (!) 97 4 °F (36 3 °C)   SpO2: 99%     General: Awake, Alert, Oriented x 3, Mood and affect appropriate  Respiratory: Respirations even and unlabored  Cardiovascular: Peripheral pulses intact; no edema  Musculoskeletal Exam: neck and arm pain    ASA Score: 2    Patient/Chart Verification  Patient ID Verified: Verbal  ID Band Applied: No  Consents Confirmed: Procedural  H&P( within 30 days) Verified:  To be obtained in the Pre-Procedure area  Interval H&P(within 24 hr) Complete (required for Outpatients and Surgery Admit only): To be obtained in the Pre-Procedure area  Allergies Reviewed: No  Anticoag/NSAID held?: Yes (motrin 24 hours ago)  Currently on antibiotics?: No    Assessment:   1   Cervicalgia        Plan: CARLOS

## 2021-08-25 NOTE — DISCHARGE INSTRUCTIONS
Epidural Steroid Injection   WHAT YOU NEED TO KNOW:   An epidural steroid injection (NAGA) is a procedure to inject steroid medicine into the epidural space  The epidural space is between your spinal cord and vertebrae  Steroids reduce inflammation and fluid buildup in your spine that may be causing pain  You may be given pain medicine along with the steroids  ACTIVITY  · Do not drive or operate machinery today  · No strenuous activity today - bending, lifting, etc   · You may resume normal activites starting tomorrow - start slowly and as tolerated  · You may shower today, but no tub baths or hot tubs  · You may have numbness for several hours from the local anesthetic  Please use caution and common sense, especially with weight-bearing activities  CARE OF THE INJECTION SITE  · If you have soreness or pain, apply ice to the area today (20 minutes on/20 minutes off)  · Starting tomorrow, you may use warm, moist heat or ice if needed  · You may have an increase or change in your discomfort for 36-48 hours after your treatment  · Apply ice and continue with any pain medication you have been prescribed  · [facility]ify [facility] if you have any of the following: redness, drainage, swelling, headache, stiff neck or fever above 100°F     SPECIAL INSTRUCTIONS  · Our office will contact you in approximately 7 days for a progress report  MEDICATIONS  · Continue to take all routine medications  · Our office may have instructed you to hold some medications  As no general anesthesia was used in today's procedure, you should not experience any side effects related to anesthesia  If you have a problem specifically related to your procedure, please call our office at [phone]  Problems not related to your procedure should be directed to your primary care physician

## 2021-09-01 ENCOUNTER — OFFICE VISIT (OUTPATIENT)
Dept: FAMILY MEDICINE CLINIC | Facility: CLINIC | Age: 46
End: 2021-09-01
Payer: COMMERCIAL

## 2021-09-01 ENCOUNTER — TELEPHONE (OUTPATIENT)
Dept: PAIN MEDICINE | Facility: CLINIC | Age: 46
End: 2021-09-01

## 2021-09-01 VITALS
HEIGHT: 68 IN | WEIGHT: 174 LBS | HEART RATE: 67 BPM | OXYGEN SATURATION: 100 % | SYSTOLIC BLOOD PRESSURE: 122 MMHG | BODY MASS INDEX: 26.37 KG/M2 | DIASTOLIC BLOOD PRESSURE: 82 MMHG | TEMPERATURE: 98 F | RESPIRATION RATE: 18 BRPM

## 2021-09-01 DIAGNOSIS — M54.12 CERVICAL RADICULOPATHY: ICD-10-CM

## 2021-09-01 DIAGNOSIS — F41.9 ANXIETY: ICD-10-CM

## 2021-09-01 DIAGNOSIS — Z23 NEED FOR TETANUS BOOSTER: Primary | ICD-10-CM

## 2021-09-01 DIAGNOSIS — M54.2 NECK PAIN: ICD-10-CM

## 2021-09-01 DIAGNOSIS — Z00.00 PHYSICAL EXAM: ICD-10-CM

## 2021-09-01 PROCEDURE — 1036F TOBACCO NON-USER: CPT | Performed by: FAMILY MEDICINE

## 2021-09-01 PROCEDURE — 90471 IMMUNIZATION ADMIN: CPT

## 2021-09-01 PROCEDURE — 90715 TDAP VACCINE 7 YRS/> IM: CPT

## 2021-09-01 PROCEDURE — 3008F BODY MASS INDEX DOCD: CPT | Performed by: FAMILY MEDICINE

## 2021-09-01 PROCEDURE — 99396 PREV VISIT EST AGE 40-64: CPT | Performed by: FAMILY MEDICINE

## 2021-09-01 NOTE — PATIENT INSTRUCTIONS
Fasting labs  Patient to call for results if he/she does not hear from us    Call pain management back to discuss nerve blocks and possible ablation  Suspect headaches coming from neck pain    Consider effexor    Today you got the tetanus shot    Advise dental exam

## 2021-09-01 NOTE — PROGRESS NOTES
FAMILY PRACTICE OFFICE VISIT    NAME: Yris Garcia    AGE: 51 y o  SEX: male  : 1975   MRN: [id number]    DATE: 2021  TIME: 5:21 PM    Assessment and Plan   1  Need for tetanus booster  adacel today    - tetanus-diphtheria-acellular pertussis (ADACEL) 5-2-15 5 LF-mcg/0 5 injection; Inject 0 5 mL into a muscle once for 1 dose  Dispense: 0 5 mL; Refill: 0    2  Physical exam  Anticipatory guidance and preventative medicine discussed  Had covid vaccine      3  Anxiety  To consider starting effexor as pt's anxiety over current neck situation is increasing his anxiety        4  Neck pain  Pt feels uncomfortable on a daily basis; starting to cause headaches  If headaches not improving upon further treatment of neck pain - pt to call    Pt seeing pain management  Pt is frustrated by ongoing pain and not feeling that he is further along with treatment  PT did not help    Pt had an epidural on 2021 - not helping  He was supposed to have a different procedure but was not covered by insurance: plan was for -   left C4-6 medial branch nerve blocks with intention of moving forward towards radiofrequency ablation if there is an appropriate diagnostic response  Pt to f/u with their office    Advise against taking muscle relaxant as it was not helping patient    Tylenol prn pain  Avoid nsaids due to recent epidural      Patient Instructions   Fasting labs  Patient to call for results if he/she does not hear from us    Call pain management back to discuss nerve blocks and possible ablation    Consider effexor    Today you got the tetanus shot    Advise dental exam              5  Cervical radiculopathy  As above         There are no Patient Instructions on file for this visit          Chief Complaint     Chief Complaint   Patient presents with    Physical Exam       History of Present Illness   Yris Garcia is a 51y o -year-old male who presents today for routine PE  Since last visit - he did not start effexor  He wants to get neck pain under control  He continues to see pain management  He was to have a procedure on 8/3/2021 but was cancelled due to insurance issues  Review of Systems   Review of Systems   Constitutional: Negative for fever  Respiratory: Negative for cough, shortness of breath and wheezing  Cardiovascular: Negative for chest pain and palpitations  Gastrointestinal: Negative for abdominal pain, blood in stool, constipation, diarrhea, nausea and vomiting  Genitourinary: Negative for dysuria  Musculoskeletal: Positive for neck pain  Pt spoke with pain management office earlier today stating 0% improvement and 8/10 pain -now has a headache today  Pain is now starting to affect upper thoracic region  Neurological: Positive for headaches  Pt feels headache is triggered from neck pain     Psychiatric/Behavioral: The patient is nervous/anxious  Did not start effexor  Is very frustrated by neck issues           Active Problem List     Patient Active Problem List   Diagnosis    Cervical radiculopathy         Past Medical History:  Past Medical History:   Diagnosis Date    Anxiety     Arthritis     Neck pain        Past Surgical History:  Past Surgical History:   Procedure Laterality Date    NO PAST SURGERIES         Family History:  Family History   Problem Relation Age of Onset    No Known Problems Mother     No Known Problems Father        Social History:  Social History     Socioeconomic History    Marital status: /Civil Union     Spouse name: Not on file    Number of children: Not on file    Years of education: Not on file    Highest education level: Not on file   Occupational History    Not on file   Tobacco Use    Smoking status: Former Smoker     Years: 10 00     Types: Cigarettes    Smokeless tobacco: Never Used   Vaping Use    Vaping Use: Every day    Substances: THC, CBD   Substance and Sexual Activity    Alcohol use:  Yes    Drug use: Yes     Types: Marijuana     Comment: medical    Sexual activity: Yes   Other Topics Concern    Not on file   Social History Narrative    Not on file     Social Determinants of Health     Financial Resource Strain:     Difficulty of Paying Living Expenses:    Food Insecurity:     Worried About Running Out of Food in the Last Year:     [address] in the Last Year:    Transportation Needs:     Lack of Transportation (Medical):  Lack of Transportation (Non-Medical):    Physical Activity:     Days of Exercise per Week:     Minutes of Exercise per Session:    Stress:     Feeling of Stress :    Social Connections:     Frequency of Communication with Friends and Family:     Frequency of Social Gatherings with Friends and Family:     Attends [employer] Services:     Active Member of Clubs or Organizations:     Attends Club or Organization Meetings:     Marital Status:    Intimate Partner Violence:     Fear of Current or Ex-Partner:     Emotionally Abused:     Physically Abused:     Sexually Abused:        Objective     Vitals:    09/01/21 1707   BP: 122/82   Pulse: 67   Resp: 18   Temp: 98 °F (36 7 °C)   SpO2: 100%     Wt Readings from Last 3 Encounters:   09/01/21 78 9 kg (174 lb)   07/15/21 78 kg (172 lb)   07/08/21 78 kg (172 lb)       Physical Exam  Vitals and nursing note reviewed  Constitutional:       General: He is not in acute distress  Appearance: Normal appearance  He is normal weight  He is not ill-appearing or toxic-appearing  HENT:      Mouth/Throat:      Mouth: Mucous membranes are moist       Pharynx: Oropharynx is clear  No oropharyngeal exudate or posterior oropharyngeal erythema  Comments: Mucous membranes moist and pink; no oropharyngeal exudates  Eyes:      General: No scleral icterus  Extraocular Movements: Extraocular movements intact  Conjunctiva/sclera: Conjunctivae normal       Pupils: Pupils are equal, round, and reactive to light  Cardiovascular:      Rate and Rhythm: Normal rate and regular rhythm  Pulses: Normal pulses  Heart sounds: Normal heart sounds  No murmur heard  Pulmonary:      Effort: Pulmonary effort is normal  No respiratory distress  Breath sounds: Normal breath sounds  No stridor  No wheezing, rhonchi or rales  Abdominal:      General: Abdomen is flat  There is no distension  Palpations: There is no mass  Tenderness: There is no abdominal tenderness  There is no guarding or rebound  Hernia: No hernia is present  Genitourinary:     Penis: Normal        Testes: Normal       Prostate: Normal    Musculoskeletal:         General: No swelling or tenderness  Normal range of motion  Cervical back: Normal range of motion and neck supple  Right lower leg: No edema  Left lower leg: No edema  Lymphadenopathy:      Cervical: No cervical adenopathy  Skin:     General: Skin is warm  Capillary Refill: Capillary refill takes less than 2 seconds  Coloration: Skin is not jaundiced  Findings: No rash  Neurological:      Mental Status: He is alert and oriented to person, place, and time  Cranial Nerves: No cranial nerve deficit  Sensory: No sensory deficit  Motor: No weakness  Coordination: Coordination normal       Gait: Gait normal       Deep Tendon Reflexes: Reflexes normal    Psychiatric:         Mood and Affect: Mood normal          Behavior: Behavior normal          Thought Content:  Thought content normal          Judgment: Judgment normal          Pertinent Laboratory/Diagnostic Studies:  No results found for: GLUCOSE, BUN, CREATININE, CALCIUM, NA, K, CO2, CL  No results found for: ALT, AST, GGT, ALKPHOS, BILITOT    No results found for: WBC, HGB, HCT, MCV, PLT    No results found for: TSH    No results found for: CHOL  No results found for: TRIG  No results found for: HDL  No results found for: LDLCALC  No results found for: HGBA1C    No results found for this or any previous visit  No orders of the defined types were placed in this encounter  ALLERGIES:  Allergies   Allergen Reactions    Bee Venom Hives       Current Medications     Current Outpatient Medications   Medication Sig Dispense Refill    acetaminophen (TYLENOL) 325 mg tablet Take 650 mg by mouth every 6 (six) hours as needed for mild pain       ibuprofen (MOTRIN) 200 mg tablet Take 400 mg by mouth every 6 (six) hours as needed for mild pain      metaxalone (SKELAXIN) 800 mg tablet Take 1/2 to 1 tab po up to bid prn muscle spasm - may cause drowsiness; avoid driving/working while taking; do not take with medical marijuana 15 tablet 0    venlafaxine (EFFEXOR-XR) 37 5 mg 24 hr capsule Take 1 capsule (37 5 mg total) by mouth daily with breakfast For 2 weeks, then increase to 2 tabs daily with breakfast; do not take with skelaxin; caution with medical marijuana 60 capsule 1    tetanus-diphtheria-acellular pertussis (ADACEL) 5-2-15 5 LF-mcg/0 5 injection Inject 0 5 mL into a muscle once for 1 dose 0 5 mL 0     No current facility-administered medications for this visit           Health Maintenance     Health Maintenance   Topic Date Due    Hepatitis C Screening  Never done    HIV Screening  Never done    Annual Physical  Never done    DTaP,Tdap,and Td Vaccines (1 - Tdap) Never done    Influenza Vaccine (1) 09/01/2021    Depression Screening PHQ  05/06/2022    BMI: Followup Plan  05/06/2022    BMI: Adult  07/15/2022    COVID-19 Vaccine  Completed    Pneumococcal Vaccine: Pediatrics (0 to 5 Years) and At-Risk Patients (6 to 59 Years)  Aged Out    HIB Vaccine  Aged Out    Hepatitis B Vaccine  Aged Out    IPV Vaccine  Aged Out    Hepatitis A Vaccine  Aged Out    Meningococcal ACWY Vaccine  Aged Out    HPV Vaccine  Aged [employer] History   Administered Date(s) Administered    SARS-CoV-2 / COVID-19 mRNA IM (Obdulia Walker) 04/26/2021, 06/02/2021          Dennise Hnaks DO

## 2021-09-07 ENCOUNTER — TELEPHONE (OUTPATIENT)
Dept: FAMILY MEDICINE CLINIC | Facility: CLINIC | Age: 46
End: 2021-09-07

## 2021-09-07 NOTE — TELEPHONE ENCOUNTER
Patient called stating that he started his Effexor on 9/2  Originally he started it in the morning but it made him tired  He pushed it to around lunch time, but that still was not good  On day 3 he pushed it to later in the day  This is about when he started to "feel weird" - nervous, anxious, sweaty palms  The past couple of days he has had loose BM  Please advise  On a good note he does want you to know that he feels like the epidural is kicking in and he has less pain

## 2021-09-07 NOTE — TELEPHONE ENCOUNTER
Regarding message below - please call pt and advise the followin  I would not give up just yet on the effexor   Recommend he try another dose in 2 days (on Thursday) and try taking 1 capsule every other day for 2 weeks, then increase to 1 a day if tolerating  2  Make sure he is taking with food  3  If at any point - with this reduced dosing schedule - he still feels the symptoms OR worsening of symptoms - have him stop taking and call with update  WOULD CONTINUE TO TAKE IN THE EVENING  Thanks!

## 2021-09-07 NOTE — TELEPHONE ENCOUNTER
Informed pt of the information    He will start this dosage as you said and then give us an update if he is not feeling well

## 2021-09-10 NOTE — TELEPHONE ENCOUNTER
Pt called in to the office stating that he has been taking the Effexor XR, for about 1 week now, and he did state to me that you told him to try and be patient, but on his days that he is taking this medication he has loose stools, nervousness, and feeling tired  On the days that he is skipping he does not have these issues  Patient is currently taking 37 5 mg once daily  Today is his skip day and will take one tomorrow  I did advise patient that you were out of the office today, and would not return till Tuesday, and once we heard back from you someone in the office would call him  I also advised him to drink plenty of fluids to stay hydrated and to take OTC medication that would help with loose stools  I told him he can talk to the pharmacist to find out what is best for him to take  Please advise  Thank you!

## 2021-09-12 NOTE — TELEPHONE ENCOUNTER
Regarding message below:  1  Again confirm that the days pt is NOT taking effexor - that he does NOT have loose stools? 2  If this is correct - it does seem to correlate with the medication (effexor)  3  He can try adding a probiotic or yogurt daily to help with the symptoms, and he should be taking med with food  4  Generally speaking - these symptoms should resolve as pt is on the medication for a bit longer  5   I dont want patient to be uncomfortable; however, so if he wants to give a trial to the probiotics  And see how he feels - that is fine  Or  If he is more comfortable stopping the med entirely - I will leave that up to him    6  Please let me know what pt decides    Thanks!

## 2021-09-14 NOTE — TELEPHONE ENCOUNTER
I am glad to hear that pt's symptoms are beginning to improve  I would agree with giving the effexor more time   And would suggest starting on daily dosing end of week as long as he continues to feel better on the every other day dosing

## 2021-09-14 NOTE — TELEPHONE ENCOUNTER
Call placed to patient  Confirmed  Was taking Effexor every other day, no loose stools on days of no Effexor  Last episode of loose stools: Wednesday 9/8  Has since resolved  Patient wondering if he has now adjusted to medication  Also had s/e of nervousness, fidgeting, sweating; since resolved this past weekend  Encouraged to take probiotics or add yogurt with probiotic to diet  Patient would like to stay on medication to see if he continues to improve  Please advise how much longer he should continue to take the medication every other day, or if/when he should increase to daily

## 2021-09-16 NOTE — TELEPHONE ENCOUNTER
L/m for patient to call PCP office back re: medication instruction/follow-up call  Patient informed in message that I will be in office until 7p

## 2021-09-16 NOTE — TELEPHONE ENCOUNTER
Spoke with patient who states that the symptoms have mostly resolved - he feels like his "normal self"  He reports he spends the first 4 hours after taking the med yawning, then he feels almost an overwhelming "wave", but that dissipates  He does say he has some restlessness at night trying to fall asleep, but once he is asleep, he is able to stay asleep  He says he believes that he will try to move to the daily dosing this week  Advised if he has any issue with increasing over the weekend he can reach an on call doc by calling our number  Advised him to report any changes      He appreciated the call back and expressed understanding

## 2021-09-21 ENCOUNTER — LAB (OUTPATIENT)
Dept: LAB | Facility: MEDICAL CENTER | Age: 46
End: 2021-09-21
Payer: COMMERCIAL

## 2021-09-21 DIAGNOSIS — Z00.00 PHYSICAL EXAM: ICD-10-CM

## 2021-09-21 LAB
ALBUMIN SERPL BCP-MCNC: 4.3 G/DL (ref 3.5–5)
ALP SERPL-CCNC: 60 U/L (ref 46–116)
ALT SERPL W P-5'-P-CCNC: 30 U/L (ref 12–78)
ANION GAP SERPL CALCULATED.3IONS-SCNC: 4 MMOL/L (ref 4–13)
AST SERPL W P-5'-P-CCNC: 19 U/L (ref 5–45)
BASOPHILS # BLD AUTO: 0.04 THOUSANDS/ΜL (ref 0–0.1)
BASOPHILS NFR BLD AUTO: 1 % (ref 0–1)
BILIRUB SERPL-MCNC: 0.54 MG/DL (ref 0.2–1)
BUN SERPL-MCNC: 19 MG/DL (ref 5–25)
CALCIUM SERPL-MCNC: 9.3 MG/DL (ref 8.3–10.1)
CHLORIDE SERPL-SCNC: 109 MMOL/L (ref 100–108)
CHOLEST SERPL-MCNC: 224 MG/DL (ref 50–200)
CO2 SERPL-SCNC: 26 MMOL/L (ref 21–32)
CREAT SERPL-MCNC: 1.06 MG/DL (ref 0.6–1.3)
EOSINOPHIL # BLD AUTO: 0.15 THOUSAND/ΜL (ref 0–0.61)
EOSINOPHIL NFR BLD AUTO: 2 % (ref 0–6)
ERYTHROCYTE [DISTWIDTH] IN BLOOD BY AUTOMATED COUNT: 12.6 % (ref 11.6–15.1)
GFR SERPL CREATININE-BSD FRML MDRD: 83 ML/MIN/1.73SQ M
GLUCOSE P FAST SERPL-MCNC: 94 MG/DL (ref 65–99)
HCT VFR BLD AUTO: 48.3 % (ref 36.5–49.3)
HDLC SERPL-MCNC: 58 MG/DL
HGB BLD-MCNC: 16.4 G/DL (ref 12–17)
IMM GRANULOCYTES # BLD AUTO: 0.01 THOUSAND/UL (ref 0–0.2)
IMM GRANULOCYTES NFR BLD AUTO: 0 % (ref 0–2)
LDLC SERPL CALC-MCNC: 152 MG/DL (ref 0–100)
LYMPHOCYTES # BLD AUTO: 2.38 THOUSANDS/ΜL (ref 0.6–4.47)
LYMPHOCYTES NFR BLD AUTO: 36 % (ref 14–44)
MCH RBC QN AUTO: 30.8 PG (ref 26.8–34.3)
MCHC RBC AUTO-ENTMCNC: 34 G/DL (ref 31.4–37.4)
MCV RBC AUTO: 91 FL (ref 82–98)
MONOCYTES # BLD AUTO: 0.5 THOUSAND/ΜL (ref 0.17–1.22)
MONOCYTES NFR BLD AUTO: 8 % (ref 4–12)
NEUTROPHILS # BLD AUTO: 3.5 THOUSANDS/ΜL (ref 1.85–7.62)
NEUTS SEG NFR BLD AUTO: 53 % (ref 43–75)
NONHDLC SERPL-MCNC: 166 MG/DL
NRBC BLD AUTO-RTO: 0 /100 WBCS
PLATELET # BLD AUTO: 292 THOUSANDS/UL (ref 149–390)
PMV BLD AUTO: 10 FL (ref 8.9–12.7)
POTASSIUM SERPL-SCNC: 4.6 MMOL/L (ref 3.5–5.3)
PROT SERPL-MCNC: 7.5 G/DL (ref 6.4–8.2)
RBC # BLD AUTO: 5.32 MILLION/UL (ref 3.88–5.62)
SODIUM SERPL-SCNC: 139 MMOL/L (ref 136–145)
TRIGL SERPL-MCNC: 71 MG/DL
TSH SERPL DL<=0.05 MIU/L-ACNC: 1.21 UIU/ML (ref 0.36–3.74)
WBC # BLD AUTO: 6.58 THOUSAND/UL (ref 4.31–10.16)

## 2021-09-21 PROCEDURE — 84443 ASSAY THYROID STIM HORMONE: CPT

## 2021-09-21 PROCEDURE — 80053 COMPREHEN METABOLIC PANEL: CPT

## 2021-09-21 PROCEDURE — 85025 COMPLETE CBC W/AUTO DIFF WBC: CPT

## 2021-09-21 PROCEDURE — 80061 LIPID PANEL: CPT

## 2021-09-21 PROCEDURE — 36415 COLL VENOUS BLD VENIPUNCTURE: CPT

## 2021-09-21 NOTE — RESULT ENCOUNTER NOTE
Will send message thru the patient portal with results of labs done this am:  1  Cholesterol is quite high with total cholesterol of 224 (normal under 200)  And LDL (bad cholesterol) of 152 (normal under 130)  2  Since I do not have a comparison - I would recommend lifestyle and dietary changes for 6 mos - to include more fruits and veggies, lean meats and fish and more whole grains  Less processed foods and red meat  Eggs not more than 2 x/week  3  And exercise at least 3-5 x/week outside of your normal work duties  4  Thyroid is normal  5    Normal cbc and cmp  PLEASE FEEL FREE TO CALL WITH ANY QUESTIONS/CONCERNS

## 2021-09-22 NOTE — TELEPHONE ENCOUNTER
Pt called stating that he feels 40% improvement   But 7/10 pain . Pt states that pain is returning again       Pt would like to know what can be done

## 2021-09-27 ENCOUNTER — OFFICE VISIT (OUTPATIENT)
Dept: PAIN MEDICINE | Facility: MEDICAL CENTER | Age: 46
End: 2021-09-27
Payer: COMMERCIAL

## 2021-09-27 VITALS
SYSTOLIC BLOOD PRESSURE: 143 MMHG | HEART RATE: 78 BPM | BODY MASS INDEX: 25.76 KG/M2 | WEIGHT: 170 LBS | DIASTOLIC BLOOD PRESSURE: 83 MMHG | HEIGHT: 68 IN

## 2021-09-27 DIAGNOSIS — M54.2 NECK PAIN: ICD-10-CM

## 2021-09-27 DIAGNOSIS — M54.12 CERVICAL RADICULOPATHY: Primary | ICD-10-CM

## 2021-09-27 DIAGNOSIS — M47.812 CERVICAL SPONDYLOSIS: ICD-10-CM

## 2021-09-27 PROCEDURE — 1036F TOBACCO NON-USER: CPT | Performed by: NURSE PRACTITIONER

## 2021-09-27 PROCEDURE — 99214 OFFICE O/P EST MOD 30 MIN: CPT | Performed by: NURSE PRACTITIONER

## 2021-09-27 PROCEDURE — 3008F BODY MASS INDEX DOCD: CPT | Performed by: NURSE PRACTITIONER

## 2021-09-27 NOTE — PROGRESS NOTES
Assessment  1  Cervical radiculopathy    2  Neck pain    3  Cervical spondylosis        Plan    Since patient continues to experience worsening pain symptoms despite physical therapy and CARLOS I feel it is reasonable to move forward with  a   Left C4-6 facet medial branch blocks with intention of moving forward towards radiofrequency ablation if there is an appropriate diagnostic response  The initial blocks will be performed with 2% lidocaine and if an appropriate response is obtained upon review of the patient's pain diary, a confirmatory block will be scheduled with 0 5% bupivacaine  will await results of patient's pain diary and follow up after procedure      Complete risks and benefits including bleeding, infection, tissue reaction, nerve injury and allergic reaction were discussed  The approach was demonstrated using models and literature was provided  Verbal and written consent was obtained  My impressions and treatment recommendations were discussed in detail with the patient who verbalized understanding and had no further questions  Discharge instructions were provided  I personally saw and examined the patient and I agree with the above discussed plan of care  Orders Placed This Encounter   Procedures    FL spine and pain procedure     Standing Status:   Future     Standing Expiration Date:   9/27/2025     Order Specific Question:   Reason for Exam:     Answer:   left C4-6 MBB#1     Order Specific Question:   Anticoagulant hold needed? Answer:   none     No orders of the defined types were placed in this encounter  History of Present Illness    Jeannette Menard is a 51 y o  male  Returns for follow-up for his chronic left axial neck pain which is rated 8/10 today  His pain is constant most bothersome in the evening and described as burning, dull, aching, sharp, throbbing, shooting, and numbness    Patient tells me that his pain affects his activities of daily living as he has to extend his neck for work and look up towards the ceiling and he is unable to do this and he has to call out of work at times  Patient did participate in physical therapy from 5/17-5/28/21 for his neck pain symptoms and was unable to complete the full six weeks because it made his symptoms worse  He now experiences migraines since participating in therapy which he did not have prior  He is status post a cervical epidural steroid injection with Dr Mitali Tavera on August 25, 2021 he tells me that he got slight relief for about a week but this only helped his shoulder and arm pain it did not do much for his daily neck pain symptoms  I have personally reviewed and/or updated the patient's past medical history, past surgical history, family history, social history, current medications, allergies, and vital signs today  Review of Systems   Respiratory: Negative for shortness of breath  Cardiovascular: Negative for chest pain  Gastrointestinal: Negative for constipation, diarrhea, nausea and vomiting  Musculoskeletal: Positive for gait problem, neck pain and neck stiffness  Negative for arthralgias, joint swelling and myalgias  Skin: Negative for rash  Neurological: Positive for dizziness  Negative for seizures and weakness  All other systems reviewed and are negative        Patient Active Problem List   Diagnosis    Cervical radiculopathy    Neck pain    Anxiety       Past Medical History:   Diagnosis Date    Anxiety     Arthritis     Neck pain        Past Surgical History:   Procedure Laterality Date    NO PAST SURGERIES         Family History   Problem Relation Age of Onset    No Known Problems Mother     No Known Problems Father        Social History     Occupational History    Not on file   Tobacco Use    Smoking status: Former Smoker     Years: 10 00     Types: Cigarettes    Smokeless tobacco: Never Used   Vaping Use    Vaping Use: Every day    Substances: THC, CBD   Substance and Sexual Activity    Alcohol use: Yes    Drug use: Yes     Types: Marijuana     Comment: medical    Sexual activity: Yes       Current Outpatient Medications on File Prior to Visit   Medication Sig    acetaminophen (TYLENOL) 325 mg tablet Take 650 mg by mouth every 6 (six) hours as needed for mild pain     venlafaxine (EFFEXOR-XR) 37 5 mg 24 hr capsule Take 1 capsule (37 5 mg total) by mouth daily with breakfast For 2 weeks, then increase to 2 tabs daily with breakfast; do not take with skelaxin; caution with medical marijuana    ibuprofen (MOTRIN) 200 mg tablet Take 400 mg by mouth every 6 (six) hours as needed for mild pain (Patient not taking: Reported on 9/27/2021)    metaxalone (SKELAXIN) 800 mg tablet Take 1/2 to 1 tab po up to bid prn muscle spasm - may cause drowsiness; avoid driving/working while taking; do not take with medical marijuana (Patient not taking: Reported on 9/27/2021)     No current facility-administered medications on file prior to visit  Allergies   Allergen Reactions    Bee Venom Hives       Physical Exam    /83   Pulse 78   Ht 5' 8" (1 727 m)   Wt 77 1 kg (170 lb)   BMI 25 85 kg/m²     Constitutional: normal, well developed, well nourished, alert, in no distress and non-toxic and no overt pain behavior    Eyes: anicteric  HEENT: grossly intact  Neck: supple, symmetric, trachea midline and no masses   Pulmonary:even and unlabored  Cardiovascular:No edema or pitting edema present  Skin:Normal without rashes or lesions and well hydrated  Psychiatric:Mood and affect appropriate  Neurologic:Cranial Nerves II-XII grossly intact  Musculoskeletal:normal   Cervical Spine Exam    Appearance:  Normal lordosis  Palpation/Tenderness:  left cervical paraspinal tenderness      Range of Motion:  Flexion:  No limitation  without pain  Extension:  No limitation  with pain  Lateral Flexion - Left:  No limitation  with pain  Lateral Flexion - Right:  No limitation  with pain  Rotation - Left: No limitation  with pain  Rotation - Right:  No limitation  with pain        Imaging

## 2021-10-11 ENCOUNTER — TELEPHONE (OUTPATIENT)
Dept: PAIN MEDICINE | Facility: MEDICAL CENTER | Age: 46
End: 2021-10-11

## 2021-10-21 DIAGNOSIS — F41.9 ANXIETY AND DEPRESSION: ICD-10-CM

## 2021-10-21 DIAGNOSIS — F32.A ANXIETY AND DEPRESSION: ICD-10-CM

## 2021-10-25 RX ORDER — VENLAFAXINE HYDROCHLORIDE 75 MG/1
CAPSULE, EXTENDED RELEASE ORAL
Qty: 90 CAPSULE | Refills: 0 | Status: SHIPPED | OUTPATIENT
Start: 2021-10-25 | End: 2022-01-12

## 2022-01-12 DIAGNOSIS — F32.A ANXIETY AND DEPRESSION: ICD-10-CM

## 2022-01-12 DIAGNOSIS — F41.9 ANXIETY AND DEPRESSION: ICD-10-CM

## 2022-01-12 RX ORDER — VENLAFAXINE HYDROCHLORIDE 75 MG/1
CAPSULE, EXTENDED RELEASE ORAL
Qty: 90 CAPSULE | Refills: 0 | Status: SHIPPED | OUTPATIENT
Start: 2022-01-12 | End: 2022-04-12

## 2022-01-17 DIAGNOSIS — Z20.822 CLOSE EXPOSURE TO COVID-19 VIRUS: Primary | ICD-10-CM

## 2022-01-17 PROCEDURE — U0005 INFEC AGEN DETEC AMPLI PROBE: HCPCS | Performed by: FAMILY MEDICINE

## 2022-01-17 PROCEDURE — U0003 INFECTIOUS AGENT DETECTION BY NUCLEIC ACID (DNA OR RNA); SEVERE ACUTE RESPIRATORY SYNDROME CORONAVIRUS 2 (SARS-COV-2) (CORONAVIRUS DISEASE [COVID-19]), AMPLIFIED PROBE TECHNIQUE, MAKING USE OF HIGH THROUGHPUT TECHNOLOGIES AS DESCRIBED BY CMS-2020-01-R: HCPCS | Performed by: FAMILY MEDICINE

## 2022-02-03 ENCOUNTER — TELEPHONE (OUTPATIENT)
Dept: PAIN MEDICINE | Facility: MEDICAL CENTER | Age: 47
End: 2022-02-03

## 2022-02-03 NOTE — TELEPHONE ENCOUNTER
Pt called stating in September he was suppose to get a procedure and it was denied 2 x  Pt went to physical therapy for 2 weeks and then done physical therapy at home  Pt still has pain  Last time we told him that we were going to do a appeal it and pt was informed how the appeal went  Can someone please call him as soon as possible  His hrs are cut at work because of his pain      Pt # [phone]

## 2022-02-04 NOTE — TELEPHONE ENCOUNTER
Panchito Fraire- did you get anywhere with the [id number] when this was initially denied?  Without the 6 weeks of PT or a reason why they cant continue it (medial reason why) I wont be able to get this authorized

## 2022-02-04 NOTE — TELEPHONE ENCOUNTER
Patient said he did not complete 6 full weeks    He went to [facility] PT back in May     Physical therapy said his symptoms do not seem to be orthopedic in nature and should be further evaluated

## 2022-02-04 NOTE — TELEPHONE ENCOUNTER
Can we call and see where patient went to physical therapy, how long he went, his insurance requires 6 full weeks of tried physical therapy  Previous procedure we denied based on this

## 2022-02-04 NOTE — TELEPHONE ENCOUNTER
Left a message asking pt to return my call to confirm where he went for PT and if completed 6 full weeks

## 2022-02-07 NOTE — TELEPHONE ENCOUNTER
Please call patient and advise  Without physical therapy records they will not approve the injection   I have submitted and AO has tried to do a [id number]

## 2022-02-07 NOTE — TELEPHONE ENCOUNTER
--ELPIDIOI--    S/W pt  Advised pt of the previous tasks  Pt upset  He stated that PT said they don't think it is orthopedic  Scheduled pt for SOVS on 2/11 at 8:45 w/ LH  Pt appreciative

## 2022-02-11 ENCOUNTER — OFFICE VISIT (OUTPATIENT)
Dept: PAIN MEDICINE | Facility: MEDICAL CENTER | Age: 47
End: 2022-02-11
Payer: COMMERCIAL

## 2022-02-11 VITALS
TEMPERATURE: 97.1 F | SYSTOLIC BLOOD PRESSURE: 126 MMHG | HEIGHT: 68 IN | OXYGEN SATURATION: 97 % | WEIGHT: 176 LBS | DIASTOLIC BLOOD PRESSURE: 78 MMHG | HEART RATE: 80 BPM | BODY MASS INDEX: 26.67 KG/M2

## 2022-02-11 DIAGNOSIS — G89.4 CHRONIC PAIN SYNDROME: Primary | ICD-10-CM

## 2022-02-11 DIAGNOSIS — M47.812 CERVICAL SPONDYLOSIS: ICD-10-CM

## 2022-02-11 DIAGNOSIS — M54.2 NECK PAIN: ICD-10-CM

## 2022-02-11 PROCEDURE — 99214 OFFICE O/P EST MOD 30 MIN: CPT | Performed by: NURSE PRACTITIONER

## 2022-02-11 PROCEDURE — 3008F BODY MASS INDEX DOCD: CPT | Performed by: NURSE PRACTITIONER

## 2022-02-11 PROCEDURE — 1036F TOBACCO NON-USER: CPT | Performed by: NURSE PRACTITIONER

## 2022-02-11 RX ORDER — IBUPROFEN 800 MG/1
800 TABLET ORAL EVERY 6 HOURS PRN
Qty: 120 TABLET | Refills: 1 | Status: SHIPPED | OUTPATIENT
Start: 2022-02-11

## 2022-02-11 NOTE — PATIENT INSTRUCTIONS
Neck Exercises   AMBULATORY CARE:   Neck exercises  help reduce neck pain, and improve neck movement and strength  Neck exercises also help prevent long-term neck problems  What you need to know about neck exercises:   · Do the exercises every day,  or as often as directed by your healthcare provider  · Move slowly, gently, and smoothly  Avoid fast or jerky motions  · Stand and sit the way your healthcare provider shows you  Good posture may reduce your neck pain  Check your posture often, even when you are not doing your neck exercises  How to perform neck exercises safely:   · Exercise position:  You may sit or stand while you do neck exercises  Face forward  Your shoulders should be straight and relaxed, with a good posture  · Head tilts, forward and back:  Gently bow your head and try to touch your chin to your chest  Your healthcare provider may tell you to push on the back of your neck to help bow your head  Raise your chin back to the starting position  Tilt your head back as far as possible so you are looking up at the ceiling  Your healthcare provider may tell you to lift your chin to help tilt your head back  Return your head to the starting position  · Head tilts, side to side:  Tilt your head, bringing your ear toward your shoulder  Then tilt your head toward the other shoulder  · Head turns:  Turn your head to look over your shoulder  Tilt your chin down and try to touch it to your shoulder  Do not raise your shoulder to your chin  Face forward again  Do the same on the other side  · Head rolls:  Slowly bring your chin toward your chest  Next, roll your head to the right  Your ear should be positioned over your shoulder  Hold this position for 5 seconds  Roll your head back toward your chest and to the left into the same position  Hold for 5 seconds  Gently roll your head back and around in a clockwise [place] 3 times   Next, move your head in the reverse direction (counterclockwise) in a [place] 3 times  Do not shrug your shoulders upwards while you do this exercise  Contact your healthcare provider if:   · Your pain does not get better, or gets worse  · You have questions or concerns about your condition, care, or exercise program     © Copyright [employer] 2021 Information is for End User's use only and may not be sold, redistributed or otherwise used for commercial purposes  All illustrations and images included in CareNotes® are the copyrighted property of [employer]  or [facility] Gilda Lai   The above information is an  only  It is not intended as medical advice for individual conditions or treatments  Talk to your doctor, nurse or pharmacist before following any medical regimen to see if it is safe and effective for you

## 2022-02-11 NOTE — PROGRESS NOTES
Assessment  1  Chronic pain syndrome    2  Neck pain    3  Cervical spondylosis        Plan  Patient would most likely benefit from left C4-C6 medial branch blocks#1 leading to radiofrequency ablation but his insurance company will not cover this procedure without 6 weeks of physical therapy which patient has not completed and states he does not want to complete at this time  He will call the office if he decides he wants to complete physical therapy and I will place a referral for him    Patient takes over-the-counter ibuprofen, I did send in a prescription for ibuprofen 800 mg every 6 hours as needed  Patient has no side effects with this medication  My impressions and treatment recommendations were discussed in detail with the patient who verbalized understanding and had no further questions  Discharge instructions were provided  I personally saw and examined the patient and I agree with the above discussed plan of care  Orders Placed This Encounter   Procedures    FL spine and pain procedure     Dr Jeannine Yates     Standing Status:   Future     Standing Expiration Date:   2/11/2026     Order Specific Question:   Reason for Exam:     Answer:   Left C4-C6 MBB#1     Order Specific Question:   Anticoagulant hold needed? Answer:   No     New Medications Ordered This Visit   Medications    ibuprofen (MOTRIN) 800 mg tablet     Sig: Take 1 tablet (800 mg total) by mouth every 6 (six) hours as needed for mild pain Take with food     Dispense:  120 tablet     Refill:  1       History of Present Illness    Malcolm Cabot is a 51 y o  male who presents to the office with a pain score of 10/10 that is constant to some degree  He states his pain varies depending on activity level with a quality that is burning, dull aching, sharp, throbbing, pressure-like with numbness and pins and needles  He states that this has been an ongoing, worsening problem for the past 10 years    He says that it has been progressively getting worse and the pain is unbearable at times  He has gone to chiropractors and has tried physical therapy but was unable to complete it due to the amount of pain he was in  His pain currently affects his activities of daily living, has started to cause migraines, is affecting his sleep and his work  He has had to leave work due to the severity of his pain while on the job  He has had an epidural steroid injection in the cervical spine that provided him with no relief from his pain symptoms  He has been doing daily exercises for his neck and shoulders 4 months with little improvement  This office has try to get his procedure covered by his insurance and has been denied on multiple occasions including a peer to peer  I have personally reviewed and/or updated the patient's past medical history, past surgical history, family history, social history, current medications, allergies, and vital signs today  Review of Systems   Respiratory: Negative for shortness of breath  Cardiovascular: Positive for chest pain  Gastrointestinal: Positive for nausea  Negative for constipation, diarrhea and vomiting  Musculoskeletal: Positive for joint swelling (arms), myalgias, neck pain and neck stiffness  Negative for arthralgias and gait problem  Skin: Negative for rash  Neurological: Negative for dizziness, seizures and weakness  All other systems reviewed and are negative        Patient Active Problem List   Diagnosis    Cervical radiculopathy    Neck pain    Anxiety       Past Medical History:   Diagnosis Date    Anxiety     Arthritis     Neck pain        Past Surgical History:   Procedure Laterality Date    NO PAST SURGERIES         Family History   Problem Relation Age of Onset    No Known Problems Mother     No Known Problems Father        Social History     Occupational History    Not on file   Tobacco Use    Smoking status: Former Smoker     Years: 10 00     Types: Cigarettes    Smokeless tobacco: Never Used   Vaping Use    Vaping Use: Every day    Substances: THC, CBD   Substance and Sexual Activity    Alcohol use: Yes    Drug use: Yes     Types: Marijuana     Comment: medical    Sexual activity: Yes       Current Outpatient Medications on File Prior to Visit   Medication Sig    acetaminophen (TYLENOL) 325 mg tablet Take 650 mg by mouth every 6 (six) hours as needed for mild pain     venlafaxine (EFFEXOR-XR) 75 mg 24 hr capsule TAKE 1 CAPSULE BY MOUTH EVERY DAY WITH FOOD DO NOT TAKE WITH SKELAXIN CAUTION WITH MEDICAL MARIJUANA    [DISCONTINUED] ibuprofen (MOTRIN) 200 mg tablet Take 400 mg by mouth every 6 (six) hours as needed for mild pain (Patient not taking: Reported on 9/27/2021)    [DISCONTINUED] metaxalone (SKELAXIN) 800 mg tablet Take 1/2 to 1 tab po up to bid prn muscle spasm - may cause drowsiness; avoid driving/working while taking; do not take with medical marijuana (Patient not taking: Reported on 9/27/2021)     No current facility-administered medications on file prior to visit  Allergies   Allergen Reactions    Bee Venom Hives       Physical Exam    /78   Pulse 80   Temp (!) 97 1 °F (36 2 °C)   Ht 5' 8" (1 727 m)   Wt 79 8 kg (176 lb)   SpO2 97%   BMI 26 76 kg/m²     Constitutional: normal, well developed, well nourished, alert, in no distress and non-toxic and no overt pain behavior  Eyes: anicteric  HEENT: grossly intact  Neck: supple, symmetric, trachea midline and no masses   Pulmonary:even and unlabored  Cardiovascular:No edema or pitting edema present  Skin:Normal without rashes or lesions and well hydrated  Psychiatric:Mood and affect appropriate  Neurologic:Cranial Nerves II-XII grossly intact  Musculoskeletal:Limited range of motion with the extension of neck due to pain and stiffness      Imaging

## 2022-02-28 NOTE — TELEPHONE ENCOUNTER
Patient told Roel Mills on his last visit that he could not complete PT because of Pain , he states he walked down to PT office and they told him it was clear in notes why he could not continue, I made patient aware Injection will not be approved without 6 wks PT  He is very frustrated and he feels like he a broke record       Please advise    Thank you

## 2022-03-02 ENCOUNTER — TELEPHONE (OUTPATIENT)
Dept: FAMILY MEDICINE CLINIC | Facility: CLINIC | Age: 47
End: 2022-03-02

## 2022-03-02 NOTE — TELEPHONE ENCOUNTER
I would recommend that pt reach out to his insurance to find out what pain management dr's in the area are covered with his plan  I can then place a referral   May have to look at [address] if nothing else in [facility]

## 2022-03-02 NOTE — TELEPHONE ENCOUNTER
We [facility] to [facility] for [facility] of physical/massage therapy and when we see him in followup we can reorder the procedure

## 2022-03-02 NOTE — TELEPHONE ENCOUNTER
Patient called  Re: Pain Management referral    Patient was evaluated by Pain Management for Neck Pain and treatment was recommended, but denied by insurance  Patient called insurance multiple times; no coverage for Pain Mgmt treatment  Recommended to continue PT  Patient spoke with Physical Therapist who stated his notes clearly state why PT was stopped  Patient feels he has stayed persistent with this but not getting anywhere  Requesting another recommendation or alternative treatment  Please advise

## 2022-03-02 NOTE — TELEPHONE ENCOUNTER
Spoke to pt, advised the same  Pt stated that he is having so much pain in his neck and he is trying to get MBB scheduled  Pt stated that he did PT in May with no relief, and doesn't want to go through that again   Pt stated he doesn't want to go to [facility] or another kind of PT at this point  123.6

## 2022-03-02 NOTE — TELEPHONE ENCOUNTER
He received a call from pain management, he stated they called him to schedule for the medial branch block    They will be calling him back tomorrow to schedule him for the treatment

## 2022-03-08 ENCOUNTER — HOSPITAL ENCOUNTER (OUTPATIENT)
Dept: RADIOLOGY | Facility: MEDICAL CENTER | Age: 47
Discharge: HOME | End: 2022-03-08
Attending: PHYSICAL MEDICINE & REHABILITATION | Admitting: PHYSICAL MEDICINE & REHABILITATION
Payer: COMMERCIAL

## 2022-03-08 VITALS
HEART RATE: 72 BPM | TEMPERATURE: 97.4 F | SYSTOLIC BLOOD PRESSURE: 144 MMHG | DIASTOLIC BLOOD PRESSURE: 96 MMHG | RESPIRATION RATE: 20 BRPM | OXYGEN SATURATION: 98 %

## 2022-03-08 DIAGNOSIS — M47.812 CERVICAL SPONDYLOSIS: ICD-10-CM

## 2022-03-08 PROCEDURE — 64490 INJ PARAVERT F JNT C/T 1 LEV: CPT | Performed by: PHYSICAL MEDICINE & REHABILITATION

## 2022-03-08 PROCEDURE — 64491 INJ PARAVERT F JNT C/T 2 LEV: CPT | Performed by: PHYSICAL MEDICINE & REHABILITATION

## 2022-03-08 RX ADMIN — Medication 1.5 ML: at 08:43

## 2022-03-08 NOTE — DISCHARGE INSTRUCTIONS
ACTIVITY  · Please do activities that will bring on the normal pain that we are rating  For example, if vacuuming or walking increases the pain, do that  This will give the most accurate response to the diary  · You may shower, but no tub baths today, or applied heat  CARE OF THE INJECTION SITE  · This area may be numb for several hours after the injection  · Notify the [facility] if you have any of the following:  redness, drainage, swelling, or fever above 100°F     SPECIAL INSTRUCTIONS  · Please return the MBB diary to our office by mail, fax, or drop it off  MEDICATIONS  · Please do not take any break through or short acting pain medications for 8 hours after the block  · Continue to take all routine medications  · Our office may have instructed you to hold some medications  As no general anesthesia was used in today's procedure, you should not experience any side effects related to anesthesia  If you have a problem specifically related to your procedure, please call our office at [phone]  Problems not related to your procedure should be directed to your primary care physician

## 2022-03-08 NOTE — H&P
History of Present Illness: The patient is a 51 y o  male who presents with complaints of left sided neck pain    Patient Active Problem List   Diagnosis    Cervical radiculopathy    Neck pain    Anxiety       Past Medical History:   Diagnosis Date    Anxiety     Arthritis     Neck pain        Past Surgical History:   Procedure Laterality Date    NO PAST SURGERIES           Current Outpatient Medications:     acetaminophen (TYLENOL) 325 mg tablet, Take 650 mg by mouth every 6 (six) hours as needed for mild pain , Disp: , Rfl:     ibuprofen (MOTRIN) 800 mg tablet, Take 1 tablet (800 mg total) by mouth every 6 (six) hours as needed for mild pain Take with food, Disp: 120 tablet, Rfl: 1    venlafaxine (EFFEXOR-XR) 75 mg 24 hr capsule, TAKE 1 CAPSULE BY MOUTH EVERY DAY WITH FOOD DO NOT TAKE WITH SKELAXIN CAUTION WITH MEDICAL MARIJUANA, Disp: 90 capsule, Rfl: 0    Allergies   Allergen Reactions    Bee Venom Hives       Physical Exam:   Vitals:    03/08/22 0817   BP: 151/89   Pulse: 76   Resp: 20   Temp: (!) 97 4 °F (36 3 °C)   SpO2: 98%     General: Awake, Alert, Oriented x 3, Mood and affect appropriate  Respiratory: Respirations even and unlabored  Cardiovascular: Peripheral pulses intact; no edema  Musculoskeletal Exam: left sided neck pain    ASA Score: 2    Patient/Chart Verification  Patient ID Verified: Verbal  ID Band Applied: No  Consents Confirmed: Procedural  H&P( within 30 days) Verified: To be obtained in the Pre-Procedure area  Interval H&P(within 24 hr) Complete (required for Outpatients and Surgery Admit only): To be obtained in the Pre-Procedure area  Allergies Reviewed: Yes  Anticoag/NSAID held?: NA  Currently on antibiotics?: No    Assessment:   1   Cervical spondylosis        Plan: Left C4-C6 MBB#1

## 2022-03-10 ENCOUNTER — TELEPHONE (OUTPATIENT)
Dept: RADIOLOGY | Facility: MEDICAL CENTER | Age: 47
End: 2022-03-10

## 2022-04-05 ENCOUNTER — HOSPITAL ENCOUNTER (OUTPATIENT)
Dept: RADIOLOGY | Facility: MEDICAL CENTER | Age: 47
Discharge: HOME | End: 2022-04-05
Attending: PHYSICAL MEDICINE & REHABILITATION | Admitting: PHYSICAL MEDICINE & REHABILITATION
Payer: COMMERCIAL

## 2022-04-05 VITALS
SYSTOLIC BLOOD PRESSURE: 158 MMHG | HEART RATE: 69 BPM | TEMPERATURE: 97.5 F | RESPIRATION RATE: 18 BRPM | DIASTOLIC BLOOD PRESSURE: 82 MMHG | OXYGEN SATURATION: 100 %

## 2022-04-05 DIAGNOSIS — M47.812 CERVICAL SPONDYLOSIS: ICD-10-CM

## 2022-04-05 DIAGNOSIS — M54.2 CERVICALGIA: ICD-10-CM

## 2022-04-05 PROCEDURE — 64491 INJ PARAVERT F JNT C/T 2 LEV: CPT | Performed by: PHYSICAL MEDICINE & REHABILITATION

## 2022-04-05 PROCEDURE — 64490 INJ PARAVERT F JNT C/T 1 LEV: CPT | Performed by: PHYSICAL MEDICINE & REHABILITATION

## 2022-04-05 RX ORDER — BUPIVACAINE HYDROCHLORIDE 5 MG/ML
1.5 INJECTION, SOLUTION EPIDURAL; INTRACAUDAL ONCE
Status: COMPLETED | OUTPATIENT
Start: 2022-04-05 | End: 2022-04-05

## 2022-04-05 RX ADMIN — BUPIVACAINE HYDROCHLORIDE 1.5 ML: 5 INJECTION, SOLUTION EPIDURAL; INTRACAUDAL at 15:08

## 2022-04-05 NOTE — DISCHARGE INSTRUCTIONS
ACTIVITY  · Please do activities that will bring on the normal pain that we are rating  For example, if vacuuming or walking increases the pain, do that  This will give the most accurate response to the diary  · You may shower, but no tub baths today, or applied heat  CARE OF THE INJECTION SITE  · This area may be numb for several hours after the injection  · Notify the [facility] if you have any of the following:  redness, drainage, swelling, or fever above 100°F     SPECIAL INSTRUCTIONS  · Please return the MBB diary to our office by mail, fax, or drop it off  MEDICATIONS  · Please do not take any break through or short acting pain medications for 8 hours after the block  · Continue to take all routine medications  · Our office may have instructed you to hold some medications  As no general anesthesia was used in today's procedure, you should not experience any side effects related to anesthesia  If you have a problem specifically related to your procedure, please call our office at [phone]  Problems not related to your procedure should be directed to your primary care physician

## 2022-04-05 NOTE — H&P
History of Present Illness: The patient is a 51 y o  male who presents with complaints of left neck pain    Patient Active Problem List   Diagnosis    Cervical radiculopathy    Neck pain    Anxiety    Cervical spondylosis       Past Medical History:   Diagnosis Date    Anxiety     Arthritis     Neck pain        Past Surgical History:   Procedure Laterality Date    NO PAST SURGERIES           Current Outpatient Medications:     acetaminophen (TYLENOL) 325 mg tablet, Take 650 mg by mouth every 6 (six) hours as needed for mild pain , Disp: , Rfl:     ibuprofen (MOTRIN) 800 mg tablet, Take 1 tablet (800 mg total) by mouth every 6 (six) hours as needed for mild pain Take with food, Disp: 120 tablet, Rfl: 1    venlafaxine (EFFEXOR-XR) 75 mg 24 hr capsule, TAKE 1 CAPSULE BY MOUTH EVERY DAY WITH FOOD DO NOT TAKE WITH SKELAXIN CAUTION WITH MEDICAL MARIJUANA, Disp: 90 capsule, Rfl: 0    Allergies   Allergen Reactions    Bee Venom Hives       Physical Exam:   Vitals:    04/05/22 1448   BP: 117/72   Pulse: 79   Resp: 18   Temp: 97 5 °F (36 4 °C)   SpO2: 98%     General: Awake, Alert, Oriented x 3, Mood and affect appropriate  Respiratory: Respirations even and unlabored  Cardiovascular: Peripheral pulses intact; no edema  Musculoskeletal Exam: left neck pain    ASA Score: 2    Patient/Chart Verification  Patient ID Verified: Verbal  ID Band Applied: No  Consents Confirmed: Procedural,To be obtained in the Pre-Procedure area  H&P( within 30 days) Verified: To be obtained in the Pre-Procedure area  Interval H&P(within 24 hr) Complete (required for Outpatients and Surgery Admit only): To be obtained in the Pre-Procedure area  Allergies Reviewed: Yes  Anticoag/NSAID held?: NA  Currently on antibiotics?: No    Assessment:   1  Cervical spondylosis    2   Cervicalgia        Plan: LT C4-6 MBB#2

## 2022-04-08 ENCOUNTER — TELEPHONE (OUTPATIENT)
Dept: RADIOLOGY | Facility: MEDICAL CENTER | Age: 47
End: 2022-04-08

## 2022-04-08 NOTE — TELEPHONE ENCOUNTER
Patient called and scheduled:    Left C4-6 RFA on 5/3  F/u with AO on 6/13    Reviewed instructions: , NPO 1 hour prior, loose-fitting/comfortable clothing, if ill/fever/infx/abx to call and reschedule  No need to hold any meds prior  Patient stated verbal understanding

## 2022-04-08 NOTE — TELEPHONE ENCOUNTER
Pain diary reviewed by Dr Nga Hull; proceed with RFA and f/u; I will call and coordinate       Left C4-6

## 2022-04-09 DIAGNOSIS — F41.9 ANXIETY AND DEPRESSION: ICD-10-CM

## 2022-04-09 DIAGNOSIS — F32.A ANXIETY AND DEPRESSION: ICD-10-CM

## 2022-04-12 RX ORDER — VENLAFAXINE HYDROCHLORIDE 75 MG/1
CAPSULE, EXTENDED RELEASE ORAL
Qty: 90 CAPSULE | Refills: 0 | Status: SHIPPED | OUTPATIENT
Start: 2022-04-12 | End: 2022-07-13

## 2022-04-27 NOTE — TELEPHONE ENCOUNTER
[facility]     Xu Juan is requesting a call back  She said that she needs some clinical notes before his next procedure  Xu Juan said that she will also send a fax so that you know what she is looking for

## 2022-04-28 NOTE — TELEPHONE ENCOUNTER
Alexx Sood from [facility] is requesting a fax  clinical notes before his next procedure         [phone]- fx

## 2022-04-29 NOTE — TELEPHONE ENCOUNTER
Spoke with patient current pain level 8/10 pain relief after MBB #2 12-24 hours after procedure pain came back

## 2022-05-02 ENCOUNTER — TELEPHONE (OUTPATIENT)
Dept: PAIN MEDICINE | Facility: MEDICAL CENTER | Age: 47
End: 2022-05-02

## 2022-05-02 NOTE — TELEPHONE ENCOUNTER
Patient is requesting to speak to Dr Brooke Camacho / nurses regarding why his procedure was cancelled by his medical insurance   Please reach out to him at # [phone]

## 2022-05-02 NOTE — TELEPHONE ENCOUNTER
Left a detailed message to make the patient aware that right now we do not have the Auth for the patients upcoming procedure on 5/3 with Dr Xavier Rivera so for now cancel

## 2022-05-03 NOTE — TELEPHONE ENCOUNTER
Called pt advised that we did not get the approval yet that we would continue to try to obtain status and advise him when we get it and reschedule asap

## 2022-05-03 NOTE — TELEPHONE ENCOUNTER
Patient is checking the status on his procedure scheduled this morning & would like to know if its being approved by his med insurance   Please advise asap, thx    Call back# [phone]

## 2022-05-24 ENCOUNTER — HOSPITAL ENCOUNTER (OUTPATIENT)
Dept: RADIOLOGY | Facility: MEDICAL CENTER | Age: 47
Discharge: HOME | End: 2022-05-24
Attending: PHYSICAL MEDICINE & REHABILITATION | Admitting: PHYSICAL MEDICINE & REHABILITATION
Payer: COMMERCIAL

## 2022-05-24 ENCOUNTER — TELEPHONE (OUTPATIENT)
Dept: PAIN MEDICINE | Facility: MEDICAL CENTER | Age: 47
End: 2022-05-24

## 2022-05-24 VITALS
TEMPERATURE: 97.4 F | DIASTOLIC BLOOD PRESSURE: 86 MMHG | HEART RATE: 62 BPM | RESPIRATION RATE: 18 BRPM | SYSTOLIC BLOOD PRESSURE: 131 MMHG | OXYGEN SATURATION: 99 %

## 2022-05-24 DIAGNOSIS — M47.812 CERVICAL SPONDYLOSIS WITHOUT MYELOPATHY: ICD-10-CM

## 2022-05-24 DIAGNOSIS — M54.2 NECK PAIN: ICD-10-CM

## 2022-05-24 PROCEDURE — 64633 DESTROY CERV/THOR FACET JNT: CPT | Performed by: PHYSICAL MEDICINE & REHABILITATION

## 2022-05-24 PROCEDURE — 64634 DESTROY C/TH FACET JNT ADDL: CPT | Performed by: PHYSICAL MEDICINE & REHABILITATION

## 2022-05-24 RX ADMIN — Medication 5 ML: at 10:32

## 2022-05-24 NOTE — H&P
History of Present Illness: The patient is a 51 y o  male who presents with complaints of left sided neck pain    Patient Active Problem List   Diagnosis    Cervical radiculopathy    Neck pain    Anxiety    Cervical spondylosis without myelopathy       Past Medical History:   Diagnosis Date    Anxiety     Arthritis     Neck pain        Past Surgical History:   Procedure Laterality Date    NO PAST SURGERIES           Current Outpatient Medications:     acetaminophen (TYLENOL) 325 mg tablet, Take 650 mg by mouth every 6 (six) hours as needed for mild pain , Disp: , Rfl:     ibuprofen (MOTRIN) 800 mg tablet, Take 1 tablet (800 mg total) by mouth every 6 (six) hours as needed for mild pain Take with food, Disp: 120 tablet, Rfl: 1    venlafaxine (EFFEXOR-XR) 75 mg 24 hr capsule, TAKE 1 CAPSULE BY MOUTH EVERY DAY WITH FOOD DO NOT TAKE WITH SKELAXIN CAUTION WITH MEDICAL MARIJUANA, Disp: 90 capsule, Rfl: 0    Current Facility-Administered Medications:     lidocaine (PF) (XYLOCAINE-MPF) 2 % injection 5 mL, 5 mL, Perineural, Once, Shashank Pop, DO    Allergies   Allergen Reactions    Bee Venom Hives       Physical Exam:   Vitals:    05/24/22 1010   BP: 145/85   Pulse: 84   Resp: 18   Temp: (!) 97 4 °F (36 3 °C)   SpO2: 98%     General: Awake, Alert, Oriented x 3, Mood and affect appropriate  Respiratory: Respirations even and unlabored  Cardiovascular: Peripheral pulses intact; no edema  Musculoskeletal Exam: left sided neck pain     ASA Score: 2    Patient/Chart Verification  Patient ID Verified: Verbal  ID Band Applied: No  Consents Confirmed: Procedural, To be obtained in the Pre-Procedure area  H&P( within 30 days) Verified: To be obtained in the Pre-Procedure area  Interval H&P(within 24 hr) Complete (required for Outpatients and Surgery Admit only): To be obtained in the Pre-Procedure area  Allergies Reviewed: Yes  Anticoag/NSAID held?: NA  Currently on antibiotics?: No    Assessment:   1   Cervical spondylosis without myelopathy    2   Neck pain        Plan: LT C4-6 RFA

## 2022-05-24 NOTE — DISCHARGE INSTRUCTIONS
Medial Branch Radiofrequency Ablation     WHAT YOU NEED TO KNOW:   Medial branch radiofrequency ablation (RFA) is a procedure used to treat facet joint pain in your neck, mid back, or lower back  Facet joints are found at the back of each vertebra  A needle electrode is used to send electrical currents to the nerves in your facet joint  The electrical currents create heat that damages the nerve so it cannot send pain signals  ACTIVITY  Do not drive or operate machinery today  No strenuous activity today - bending, lifting, etc   You may shower today, but do not sit in a tub of water  Resume normal activities tomorrow as tolerated  CARE OF THE INJECTION SITE  If you have soreness or pain, apply ice to the area today (20 minutes on/20 minutes off)  Starting tomorrow, you may use warm, moist heat or ice if needed  Notify the [facility] if you have any of the following: redness, drainage, swelling, or fever above 100°F     SPECIAL INSTRUCTIONS  Our office will call you tomorrow for a progress report and make an appointment for a follow up visit in 4 weeks  If you feel a sunburn-like sensation in the area of your procedure, call our office  MEDICATIONS  Continue to take all routine medications  Our office may have instructed you to hold some medications  As no general anesthesia was used in today's procedure, you should not experience any side effects related to anesthesia  If you have a problem specifically related to your procedure, please call our office at [phone]  Problems not related to your procedure should be directed to your primary care physician

## 2022-05-25 NOTE — TELEPHONE ENCOUNTER
S/w pt  Per pt " I am good, no problems at all  Just a little tenderness " Pt advised he can try OTC meds such as Tylenol or Ibuprofen or ice 20 mins on 20 mins off  Denies s/s of infection and or sunburn like sensation  Pt reminded it takes 2 weeks to notice a difference and 4-6 weeks for the full benefit of the procedure  Pt verbalized understanding  Next ov confirmed for 6/13 with AO and 9:15 arrival time

## 2022-06-13 ENCOUNTER — OFFICE VISIT (OUTPATIENT)
Dept: PAIN MEDICINE | Facility: MEDICAL CENTER | Age: 47
End: 2022-06-13
Payer: COMMERCIAL

## 2022-06-13 VITALS
BODY MASS INDEX: 27.37 KG/M2 | SYSTOLIC BLOOD PRESSURE: 127 MMHG | DIASTOLIC BLOOD PRESSURE: 82 MMHG | WEIGHT: 180.6 LBS | HEART RATE: 77 BPM | HEIGHT: 68 IN

## 2022-06-13 DIAGNOSIS — M54.2 NECK PAIN: ICD-10-CM

## 2022-06-13 DIAGNOSIS — M79.18 MYOFASCIAL PAIN SYNDROME: ICD-10-CM

## 2022-06-13 DIAGNOSIS — M47.812 CERVICAL SPONDYLOSIS WITHOUT MYELOPATHY: Primary | ICD-10-CM

## 2022-06-13 PROCEDURE — 99213 OFFICE O/P EST LOW 20 MIN: CPT | Performed by: NURSE PRACTITIONER

## 2022-06-13 PROCEDURE — 3008F BODY MASS INDEX DOCD: CPT | Performed by: NURSE PRACTITIONER

## 2022-06-13 PROCEDURE — 1036F TOBACCO NON-USER: CPT | Performed by: NURSE PRACTITIONER

## 2022-06-13 RX ORDER — TIZANIDINE 4 MG/1
4 TABLET ORAL EVERY 8 HOURS PRN
Qty: 30 TABLET | Refills: 2 | Status: SHIPPED | OUTPATIENT
Start: 2022-06-13

## 2022-06-13 NOTE — PROGRESS NOTES
Assessment:  1  Cervical spondylosis without myelopathy    2  Neck pain    3  Myofascial pain syndrome        Plan: At this time the patient is feeling at least 50% better following his left C4-6 radiofrequency ablation  We will initiate tizanidine 4 mg at bedtime as he is noticing a little bit of left trapezius myofascial pain  Follow-up as needed        My impressions and treatment recommendations were discussed in detail with the patient who verbalized understanding and had no further questions  Discharge instructions were provided  I personally saw and examined the patient and I agree with the above discussed plan of care  No orders of the defined types were placed in this encounter  New Medications Ordered This Visit   Medications    tiZANidine (ZANAFLEX) 4 mg tablet     Sig: Take 1 tablet (4 mg total) by mouth every 8 (eight) hours as needed for muscle spasms     Dispense:  30 tablet     Refill:  2       History of Present Illness:  Rasheed Parks is a 51 y o  male who presents for follow-up related to his chronic left-sided neck and head pain symptoms  Today he reports he is doing better rates his pain 3/10  He has intermittent pain in the morning described as dull, aching, and throbbing  He is status post a left C4-6 radiofrequency ablation with Dr Alec Ramos on May 24, 2022 and he is reporting that he is doing at least 50% better  Patient tells me that he has not noticed a severe neck and head pain while he is at work which is an improvement  He is able to be doing a lot of activity  His only issue is that he is notice some muscle pain in his left upper trapezius since having the procedure  I have personally reviewed and/or updated the patient's past medical history, past surgical history, family history, social history, current medications, allergies, and vital signs today       Review of Systems    Patient Active Problem List   Diagnosis    Cervical radiculopathy    Neck pain  Anxiety    Cervical spondylosis without myelopathy       Past Medical History:   Diagnosis Date    Anxiety     Arthritis     Neck pain        Past Surgical History:   Procedure Laterality Date    NO PAST SURGERIES         Family History   Problem Relation Age of Onset    No Known Problems Mother     No Known Problems Father        Social History     Occupational History    Not on file   Tobacco Use    Smoking status: Former Smoker     Years: 10 00     Types: Cigarettes    Smokeless tobacco: Never Used   Vaping Use    Vaping Use: Every day    Substances: THC, CBD   Substance and Sexual Activity    Alcohol use: Yes    Drug use: Yes     Types: Marijuana     Comment: medical    Sexual activity: Yes       Current Outpatient Medications on File Prior to Visit   Medication Sig    acetaminophen (TYLENOL) 325 mg tablet Take 650 mg by mouth every 6 (six) hours as needed for mild pain     ibuprofen (MOTRIN) 800 mg tablet Take 1 tablet (800 mg total) by mouth every 6 (six) hours as needed for mild pain Take with food    venlafaxine (EFFEXOR-XR) 75 mg 24 hr capsule TAKE 1 CAPSULE BY MOUTH EVERY DAY WITH FOOD DO NOT TAKE WITH SKELAXIN CAUTION WITH MEDICAL MARIJUANA     No current facility-administered medications on file prior to visit  Allergies   Allergen Reactions    Bee Venom Hives       Physical Exam:    /82   Pulse 77   Ht 5' 8" (1 727 m)   Wt 81 9 kg (180 lb 9 6 oz)   BMI 27 46 kg/m²     Constitutional:normal, well developed, well nourished, alert, in no distress and non-toxic and no overt pain behavior    Eyes:anicteric  HEENT:grossly intact  Neck:supple, symmetric, trachea midline and no masses   Pulmonary:even and unlabored  Cardiovascular:No edema or pitting edema present  Skin:Normal without rashes or lesions and well hydrated  Psychiatric:Mood and affect appropriate  Neurologic:Cranial Nerves II-XII grossly intact  Musculoskeletal:normal    Imaging

## 2022-08-12 ENCOUNTER — TELEPHONE (OUTPATIENT)
Dept: PAIN MEDICINE | Facility: MEDICAL CENTER | Age: 47
End: 2022-08-12

## 2022-08-12 NOTE — TELEPHONE ENCOUNTER
RN attempted to reach pt regarding previous  [facility] with c/b number office hours and location provided  If pt calls back his pharmacy is filling his script and he had 2 refills left

## 2022-08-12 NOTE — TELEPHONE ENCOUNTER
Patient would like to speak to a nurse regarding his cervical & upper back pain # [phone], thx    Pt contacted Call Center requested refill of their medication  Medication Name: Tizanidine      Dosage of Med: 4 mg      Frequency of Med: Take 1 tablet (4 mg total) by mouth every 8 (eight) hours as needed for muscle spasms      Remaining Medication: 0      Pharmacy and Location: [facility] pharmacy on file         Pt  Preferred Callback Phone Number: [phone]      Thank you  Writer spoke with patient.  Patient verbalized understanding. No further questions at this time. Patient appreciative of services provided.    Patient in agreement with plan of care. States she is seeing Dr. Matamoros tomorrow.

## 2022-08-17 NOTE — TELEPHONE ENCOUNTER
S/w pt  Pt had a Cervical RFA on 5/24 with relief  Per pt he now has pain returning in his neck and  LT shoulder  Pt scheduled for an ov on 9/8 with a 7:45 arrival time to have his pain re-evaluated  Pt appreciated

## 2022-08-17 NOTE — TELEPHONE ENCOUNTER
Patient returned call from nurses regarding his pain level - the tizandine isnt helping a lot right now for pain and wants to try another procedure       [phone]

## 2022-09-08 ENCOUNTER — OFFICE VISIT (OUTPATIENT)
Dept: PAIN MEDICINE | Facility: MEDICAL CENTER | Age: 47
End: 2022-09-08
Payer: COMMERCIAL

## 2022-09-08 ENCOUNTER — APPOINTMENT (OUTPATIENT)
Dept: RADIOLOGY | Facility: MEDICAL CENTER | Age: 47
End: 2022-09-08
Payer: COMMERCIAL

## 2022-09-08 VITALS
WEIGHT: 180 LBS | BODY MASS INDEX: 27.28 KG/M2 | HEIGHT: 68 IN | DIASTOLIC BLOOD PRESSURE: 68 MMHG | HEART RATE: 70 BPM | SYSTOLIC BLOOD PRESSURE: 116 MMHG | TEMPERATURE: 97.3 F | OXYGEN SATURATION: 98 %

## 2022-09-08 DIAGNOSIS — M54.2 NECK PAIN: ICD-10-CM

## 2022-09-08 DIAGNOSIS — M54.6 THORACIC SPINE PAIN: ICD-10-CM

## 2022-09-08 DIAGNOSIS — M47.812 CERVICAL SPONDYLOSIS WITHOUT MYELOPATHY: Primary | ICD-10-CM

## 2022-09-08 DIAGNOSIS — M79.18 MYOFASCIAL PAIN SYNDROME: ICD-10-CM

## 2022-09-08 DIAGNOSIS — M47.812 CERVICAL SPONDYLOSIS WITHOUT MYELOPATHY: ICD-10-CM

## 2022-09-08 PROCEDURE — 72072 X-RAY EXAM THORAC SPINE 3VWS: CPT

## 2022-09-08 PROCEDURE — 99214 OFFICE O/P EST MOD 30 MIN: CPT | Performed by: PHYSICIAN ASSISTANT

## 2022-09-08 RX ORDER — METHOCARBAMOL 750 MG/1
TABLET, FILM COATED ORAL
Qty: 90 TABLET | Refills: 1 | Status: SHIPPED | OUTPATIENT
Start: 2022-09-08

## 2022-09-08 NOTE — PROGRESS NOTES
Assessment:  1  Cervical spondylosis without myelopathy    2  Neck pain    3  Myofascial pain syndrome    4  Thoracic spine pain        Plan:  The patient is status post cervical radiofrequency ablation on May 24th with about 50% improvement and the joint based pain he was experiencing along with some improvement range of motion  Unfortunately his most severe pain complaint now is that of myofascial tightness and spasm  After discussing options we have elected to schedule him for trigger point injections to address this issue  I have also recommended that we discontinue the tizanidine and proceed with a trial of methocarbamol 750 mg t i d  p r n  He will continue ibuprofen as needed to be taken with food  He will continue his home exercise program       We will also check x-rays of the thoracic spine as he is reporting some pain in the upper thoracic region  Complete risks and benefits including bleeding, infection, tissue reaction, nerve injury and allergic reaction were discussed  The approach was demonstrated using models and literature was provided  Verbal and written consent was obtained  My impressions and treatment recommendations were discussed in detail with the patient who verbalized understanding and had no further questions  Discharge instructions were provided  I personally saw and examined the patient and I agree with the above discussed plan of care  Orders Placed This Encounter   Procedures    X-ray thoracic spine 3 views     Standing Status:   Future     Standing Expiration Date:   2026     Scheduling Instructions:      Bring along any outside films relating to this procedure             New Medications Ordered This Visit   Medications    methocarbamol (Robaxin-750) 750 mg tablet     Si tab TID prn     Dispense:  90 tablet     Refill:  1       History of Present Illness:  Marlise Schwab is a 49 y o  male who presents for a follow up office visit in regards to Neck Pain    The patients current symptoms include neck pain and upper back pain described as a constant burning, dull, aching, sharp and cramping type of pain  He rates it a 6-7/10 on the pain scale today  He underwent left cervical RFA in May of 2022 with about 50% improvement in mechanical pain and improvement in range of motion  However he is complaining of quite a bit of muscle tightness and spasm in the neck and upper back region  He has some referred pain into the left shoulder but no radicular pain or weakness of the upper extremities  He is taking tizanidine with little relief of the symptoms  I have personally reviewed and/or updated the patient's past medical history, past surgical history, family history, social history, current medications, allergies, and vital signs today  Review of Systems   Respiratory: Negative for shortness of breath  Cardiovascular: Negative for chest pain  Gastrointestinal: Negative for constipation, diarrhea, nausea and vomiting  Musculoskeletal: Positive for neck pain and neck stiffness  Negative for arthralgias, gait problem and joint swelling  Skin: Negative for rash  Neurological: Positive for dizziness and weakness  Negative for seizures  All other systems reviewed and are negative        Patient Active Problem List   Diagnosis    Cervical radiculopathy    Neck pain    Anxiety    Cervical spondylosis without myelopathy       Past Medical History:   Diagnosis Date    Anxiety     Arthritis     Neck pain        Past Surgical History:   Procedure Laterality Date    NO PAST SURGERIES         Family History   Problem Relation Age of Onset    No Known Problems Mother     No Known Problems Father        Social History     Occupational History    Not on file   Tobacco Use    Smoking status: Former Smoker     Years: 10 00     Types: Cigarettes    Smokeless tobacco: Never Used   Vaping Use    Vaping Use: Every day    Substances: THC, CBD   Substance and Sexual Activity    Alcohol use: Yes    Drug use: Yes     Types: Marijuana     Comment: medical    Sexual activity: Yes       Current Outpatient Medications on File Prior to Visit   Medication Sig    acetaminophen (TYLENOL) 325 mg tablet Take 650 mg by mouth every 6 (six) hours as needed for mild pain     ibuprofen (MOTRIN) 800 mg tablet Take 1 tablet (800 mg total) by mouth every 6 (six) hours as needed for mild pain Take with food    venlafaxine (EFFEXOR-XR) 75 mg 24 hr capsule TAKE 1 CAPSULE BY MOUTH EVERY DAY WITH FOOD DO NOT TAKE WITH SKELAXIN CAUTION WITH MEDICAL MARIJUANA    [DISCONTINUED] tiZANidine (ZANAFLEX) 4 mg tablet Take 1 tablet (4 mg total) by mouth every 8 (eight) hours as needed for muscle spasms     No current facility-administered medications on file prior to visit  Allergies   Allergen Reactions    Bee Venom Hives       Physical Exam:    /68   Pulse 70   Temp (!) 97 3 °F (36 3 °C)   Ht 5' 8" (1 727 m)   Wt 81 6 kg (180 lb)   SpO2 98%   BMI 27 37 kg/m²     Constitutional:normal, well developed, well nourished, alert, in no distress and non-toxic and no overt pain behavior  Eyes:anicteric  HEENT:grossly intact  Neck:supple, symmetric, trachea midline and no masses   Pulmonary:even and unlabored  Cardiovascular:No edema or pitting edema present  Skin:Normal without rashes or lesions and well hydrated  Psychiatric:Mood and affect appropriate  Neurologic:Cranial Nerves II-XII grossly intact  Musculoskeletal:  Tenderness to palpation over the bilateral paraspinal muscles of the cervical spine and upper thoracic spine, tenderness palpation over the left upper thoracic facet joints  Improved range of motion  Negative Spurling's test bilaterally      Imaging

## 2022-09-08 NOTE — PATIENT INSTRUCTIONS
Trigger Point Injection   WHAT YOU NEED TO KNOW:   What do I need to know about a trigger point injection? A trigger point injection is used to relax a muscle knot  This helps relieve pain  You may be able to have more than one trigger point treated during a session  How do I prepare for a trigger point injection? Your healthcare provider will tell you how to prepare  Arrange to have someone drive you home after the injection  Tell your provider about all medicines you take, including pain medicine, blood thinners, and muscle relaxers  He or she will tell you if you need to stop any medicine for the injection, and when to stop  He or she will tell you which medicines to take or not take on the day of the injection  Tell your provider about all your allergies, including to any pain medicine  What will happen during a trigger point injection? You may be sitting or lying, depending on where the trigger point is located  Your healthcare provider will feel for a knot in the muscle  He or she may ghazala your skin over the knot  Your provider will put a needle through your skin and into the trigger point  Saline (salt solution), pain relievers, or other medicines may be pushed through the needle into the trigger point  Your provider may use only a dry needle (no medicine)  He or she will pull the needle almost all the way out and then push it in again  He or she will repeat this several times until the muscle stops twitching or feels relaxed  Your provider will remove the needle and stretch the muscle area  He or she may apply pressure to the area for 2 minutes  A bandage will be put over the injection site to prevent bleeding or an infection  What should I expect after a trigger point injection? You may feel pain relief right away  It is normal for some pain to start again 2 hours later  An ice pack or over-the-counter pain medicine can help lower the pain      You may feel sore in the injection site for a few days  If you need another injection in the same area, wait until the area is not sore  Your healthcare provider may give you specific activity instructions to follow at home or recommend physical therapy  In general, you should try to stay active  Avoid strenuous activity for the first 3 or 4 days after the injection  Do not have more injections if you still have trigger point pain after 2 or 3 injections  What are the risks of a trigger point injection? You may have a severe allergic reaction to pain medicine injected  The injection may be painful, or you may be sore where you got the injection  You may bleed, bruise, or develop an infection in the injection area  The injection may cause you to feel faint  Rarely, the needle may cause muscle or blood vessel damage or your lung may collapse if you get the injection near your chest   CARE AGREEMENT:   You have the right to help plan your care  Learn about your health condition and how it may be treated  Discuss treatment options with your healthcare providers to decide what care you want to receive  You always have the right to refuse treatment  The above information is an  only  It is not intended as medical advice for individual conditions or treatments  Talk to your doctor, nurse or pharmacist before following any medical regimen to see if it is safe and effective for you  © Copyright [employer] 2022 Information is for End User's use only and may not be sold, redistributed or otherwise used for commercial purposes   All illustrations and images included in CareNotes® are the copyrighted property of [employer]  or [facility] [employer]

## 2022-09-29 ENCOUNTER — OFFICE VISIT (OUTPATIENT)
Dept: PAIN MEDICINE | Facility: MEDICAL CENTER | Age: 47
End: 2022-09-29
Payer: COMMERCIAL

## 2022-09-29 ENCOUNTER — TELEPHONE (OUTPATIENT)
Dept: PAIN MEDICINE | Facility: MEDICAL CENTER | Age: 47
End: 2022-09-29

## 2022-09-29 VITALS
WEIGHT: 180 LBS | BODY MASS INDEX: 27.28 KG/M2 | OXYGEN SATURATION: 98 % | HEART RATE: 70 BPM | SYSTOLIC BLOOD PRESSURE: 122 MMHG | DIASTOLIC BLOOD PRESSURE: 66 MMHG | HEIGHT: 68 IN

## 2022-09-29 DIAGNOSIS — M79.18 MYOFASCIAL PAIN SYNDROME: Primary | ICD-10-CM

## 2022-09-29 PROCEDURE — 20552 NJX 1/MLT TRIGGER POINT 1/2: CPT | Performed by: PHYSICIAN ASSISTANT

## 2022-09-29 RX ORDER — LIDOCAINE HYDROCHLORIDE 10 MG/ML
5 INJECTION, SOLUTION EPIDURAL; INFILTRATION; INTRACAUDAL; PERINEURAL ONCE
Status: COMPLETED | OUTPATIENT
Start: 2022-09-29 | End: 2022-09-29

## 2022-09-29 RX ADMIN — LIDOCAINE HYDROCHLORIDE 5 ML: 10 INJECTION, SOLUTION EPIDURAL; INFILTRATION; INTRACAUDAL; PERINEURAL at 11:47

## 2022-09-29 NOTE — PATIENT INSTRUCTIONS
Trigger Point Injection   AMBULATORY CARE:   A trigger point injection  is used to relax a muscle knot  This helps relieve pain  You may be able to have more than one trigger point treated during a session  How to prepare for a trigger point injection:   Your healthcare provider will tell you how to prepare  Arrange to have someone drive you home after the injection  Tell your provider about all medicines you take, including pain medicine, blood thinners, and muscle relaxers  He or she will tell you if you need to stop any medicine for the injection, and when to stop  He or she will tell you which medicines to take or not take on the day of the injection  Tell your provider about all your allergies, including to any pain medicine  What will happen during a trigger point injection:   You may be sitting or lying, depending on where the trigger point is located  Your healthcare provider will feel for a knot in the muscle  He or she may ghazala your skin over the knot  Your provider will put a needle through your skin and into the trigger point  Saline (salt solution), pain relievers, or other medicines may be pushed through the needle into the trigger point  Your provider may use only a dry needle (no medicine)  He or she will pull the needle almost all the way out and then push it in again  He or she will repeat this several times until the muscle stops twitching or feels relaxed  Your provider will remove the needle and stretch the muscle area  He or she may apply pressure to the area for 2 minutes  A bandage will be put over the injection site to prevent bleeding or an infection  What to expect after a trigger point injection:   You may feel pain relief right away  It is normal for some pain to start again 2 hours later  An ice pack or over-the-counter pain medicine can help lower the pain  You may feel sore in the injection site for a few days   If you need another injection in the same area, wait until the area is not sore  Your healthcare provider may give you specific activity instructions to follow at home or recommend physical therapy  In general, you should try to stay active  Avoid strenuous activity for the first 3 or 4 days after the injection  Do not have more injections if you still have trigger point pain after 2 or 3 injections  Risks of a trigger point injection:  You may have a severe allergic reaction to pain medicine injected  The injection may be painful, or you may be sore where you got the injection  You may bleed, bruise, or develop an infection in the injection area  The injection may cause you to feel faint  Rarely, the needle may cause muscle or blood vessel damage or your lung may collapse if you get the injection near your chest   Call your local emergency number (911 in the [address] [place] [address],[address]), or have someone call if:   Your mouth and throat are swollen  You are wheezing or have trouble breathing  You have chest pain or your heart is beating faster than usual     You feel like you are going to faint  When should I seek immediate care? Your face is red or swollen  You have hives that spread over your body  You feel weak or dizzy  Call your doctor or pain specialist if:   You have a fever within 1 week of the injection  You have redness or swelling within 1 week of the injection  You have new or worsening pain near the injection site  You have questions or concerns about your condition or care  Self-care:   Stay active after you have trigger point injections  Gently move your joints through their full range of motion during the first week  Avoid strenuous activity for 3 or 4 days  Do regular stretches of the trigger point muscle  Place gentle pressure on the trigger point, and then stretch the muscle  Ask your healthcare provider for more information about how to stretch and apply pressure  Apply ice to the injection site    Use an ice pack, or put ice in a plastic bag  Cover the bag with a towel before you apply it  Apply ice for 15 to 20 minutes every hour, or as directed  Apply heat to trigger point sites  Heat can help relax muscles and relieve trigger point pain  Use a heat pack or a heating pad set on low  Apply heat for 15 minutes every hour, or as directed  Follow up with your doctor or pain specialist as directed:  Write down your questions so you remember to ask them during your visits  © Copyright [employer] 2022 Information is for End User's use only and may not be sold, redistributed or otherwise used for commercial purposes  All illustrations and images included in CareNotes® are the copyrighted property of [employer] , Inc  or [address] [place], [place] [address]oh[place]   The above information is an  only  It is not intended as medical advice for individual conditions or treatments  Talk to your doctor, nurse or pharmacist before following any medical regimen to see if it is safe and effective for you

## 2022-09-29 NOTE — TELEPHONE ENCOUNTER
S/W pt  Pt asking for an arthritis pain medication for his neck pain  Pt asking why he didn't get a TPI for his neck when he was here today? His neck pain is a 9-10/10  Please advise

## 2022-09-29 NOTE — PROGRESS NOTES
Procedure: Trigger Point Injection  Procedure Note:  After fully informed written consent was obtained, the above procedure was performed  Using aseptic technique a 25-gauge needle was placed in the region of the bilateral trapezius and bilateral paraspinal cervical muscles  Approximately 5 cc of 1% Lidocaine was injected in a fan-like fashion after negative aspiration with each cc  After adequate anesthesia was obtained, the areas were dry-needled  Complications:  None  Disposition: Motor function was intact  Vital signs stable  The patient was discharged home  The discharge instruction sheet was given to the patient  The patient was discharged with instructions to call immediately if there are any complications  I did review the trigger point injection discharge instructions with the patient  I advised the patient that once we receive the pain diary, our office will place a follow up phone call to evaluate the effectiveness of the trigger point injections  I explained that the best results from the injections typically occur within the next 3-5 days  I did explain that it is normal to feel an increase in soreness and/or pain, and even bruising   I did encourage heat/cold regiments, which ever decreases pain, as well as continuing a home stretching program

## 2022-09-29 NOTE — TELEPHONE ENCOUNTER
Patient called asking for :    -Medication for arthiris for his neck pain     -Pt is asking for TPI for his neck    -Pt stated that hes not getting much relief       thank you     cb [phone]

## 2022-09-30 DIAGNOSIS — M47.812 CERVICAL SPONDYLOSIS WITHOUT MYELOPATHY: Primary | ICD-10-CM

## 2022-09-30 RX ORDER — MELOXICAM 15 MG/1
15 TABLET ORAL DAILY
Qty: 30 TABLET | Refills: 2 | Status: SHIPPED | OUTPATIENT
Start: 2022-09-30 | End: 2022-10-26 | Stop reason: SDUPTHER

## 2022-09-30 NOTE — TELEPHONE ENCOUNTER
Aware     I'd be happy to do some under ultrasound as well ok to schedule him next Thursday or the following Thursday with me (ie ok to double book)

## 2022-09-30 NOTE — TELEPHONE ENCOUNTER
I explained the trigger point injections were simple injections geared just towards just the myofascial component  I'm not sure what more to offer him;  maybe Dr Musa Gamez could do ultrasound guided trigger point injections  I sent Meloxicam over to his pharmacy

## 2022-09-30 NOTE — TELEPHONE ENCOUNTER
S/W pt  Advised pt of the same  Pt feels the appt was "under whelming" and felt like he got minimal treatment  He thought he would of gotten more injections  Pt stated it takes awhile to get appts  He wasn't happy with the visit and feels like he is not getting relief  Pt asking if he needs another treatment for his neck? He is not sure if he needs to give the injections more time  He thought the injections would of been more in the neck itself  He stated he has pain for 2 years now and is not getting relief  Pt is frustrated and keeps waiting for another appt  Please advise

## 2022-09-30 NOTE — TELEPHONE ENCOUNTER
?? I did perform trigger point injections in the paraspinal muscles/trap muscles  Yes, I can prescribe meloxicam 15mg QD

## 2022-10-06 ENCOUNTER — PROCEDURE VISIT (OUTPATIENT)
Dept: PAIN MEDICINE | Facility: MEDICAL CENTER | Age: 47
End: 2022-10-06
Payer: COMMERCIAL

## 2022-10-06 DIAGNOSIS — M79.18 MYOFASCIAL PAIN SYNDROME: Primary | ICD-10-CM

## 2022-10-06 PROCEDURE — 76942 ECHO GUIDE FOR BIOPSY: CPT | Performed by: PHYSICAL MEDICINE & REHABILITATION

## 2022-10-06 PROCEDURE — 20553 NJX 1/MLT TRIGGER POINTS 3/>: CPT | Performed by: PHYSICAL MEDICINE & REHABILITATION

## 2022-10-06 RX ORDER — BUPIVACAINE HYDROCHLORIDE 2.5 MG/ML
5 INJECTION, SOLUTION EPIDURAL; INFILTRATION; INTRACAUDAL ONCE
Status: COMPLETED | OUTPATIENT
Start: 2022-10-06 | End: 2022-10-06

## 2022-10-06 RX ORDER — METHYLPREDNISOLONE ACETATE 40 MG/ML
40 INJECTION, SUSPENSION INTRA-ARTICULAR; INTRALESIONAL; INTRAMUSCULAR; SOFT TISSUE ONCE
Status: COMPLETED | OUTPATIENT
Start: 2022-10-06 | End: 2022-10-06

## 2022-10-06 RX ADMIN — METHYLPREDNISOLONE ACETATE 40 MG: 40 INJECTION, SUSPENSION INTRA-ARTICULAR; INTRALESIONAL; INTRAMUSCULAR; SOFT TISSUE at 13:42

## 2022-10-06 RX ADMIN — BUPIVACAINE HYDROCHLORIDE 5 ML: 2.5 INJECTION, SOLUTION EPIDURAL; INFILTRATION; INTRACAUDAL at 13:41

## 2022-10-06 NOTE — PROGRESS NOTES
Indication:  Muscular pain  Preprocedure diagnosis:  1  Myofascial pain syndrome  Postprocedure diagnosis:  1  Myofascial pain syndrome    Procedure:  Ultrasound-guided bilateral trapezius and left thoracic paraspinal muscle trigger point injection(s)  After discussing the risks, benefits, and alternatives to the procedure, the patient expressed understanding and wished to proceed  The patient was brought to the procedure suite and placed in the prone position  A procedural pause was conducted to verify:  correct patient identity, procedure to be performed and as applicable, correct side and site, correct patient position, and availability of implants, special equipment or special requirements  A simple surgical tray was used  Prior to the procedure, the tender area was examined with a 12 MHz linear transducer to visualize the area of interest and determine the optimal needle path  Following this, the area was prepared with a ChloraPrep scrub, then re-examined using the same transducer, a sterile ultrasound transducer cover, and sterile ultrasound transducer gel  Thereafter, using ultrasound guidance, a 2 5-inch 25-gauge needle was advanced into the trapezius muscle on both sides as well as the left thoracic paraspinal musculature  After visualization of the tip and negative aspiration for blood, 2 cc of a a mixture of 40 mg Depo-Medrol in 4 cc of 0 25% bupivacaine was injected into the 3 tender areas  Following the injection, the needle was withdrawn  The patient tolerated the procedure well and there were no apparent complications  After an appropriate amount of observation, the patient was dismissed from the clinic in good condition under their own power

## 2022-10-13 ENCOUNTER — TELEPHONE (OUTPATIENT)
Dept: PAIN MEDICINE | Facility: CLINIC | Age: 47
End: 2022-10-13

## 2022-10-13 NOTE — TELEPHONE ENCOUNTER
Caller: Bryson Acuña  Doctor/office: LATISHA  #: [phone]      Patient wants to know if can take advil or aspirin with meloxicam

## 2022-10-13 NOTE — TELEPHONE ENCOUNTER
S/w pt and advised that Meloxicam is an NSAID so could be irritating to his stomach if he takes more than one NSAID. Pt advised as long as there are no contraindications to try Tylenol. Pt verbalized understanding.

## 2022-10-24 ENCOUNTER — OFFICE VISIT (OUTPATIENT)
Dept: PAIN MEDICINE | Facility: MEDICAL CENTER | Age: 47
End: 2022-10-24
Payer: COMMERCIAL

## 2022-10-24 VITALS
WEIGHT: 180 LBS | BODY MASS INDEX: 27.28 KG/M2 | SYSTOLIC BLOOD PRESSURE: 123 MMHG | HEIGHT: 68 IN | DIASTOLIC BLOOD PRESSURE: 80 MMHG | HEART RATE: 73 BPM

## 2022-10-24 DIAGNOSIS — M47.812 CERVICAL SPONDYLOSIS WITHOUT MYELOPATHY: Primary | ICD-10-CM

## 2022-10-24 DIAGNOSIS — M79.18 MYOFASCIAL PAIN SYNDROME: ICD-10-CM

## 2022-10-24 DIAGNOSIS — M54.81 BILATERAL OCCIPITAL NEURALGIA: ICD-10-CM

## 2022-10-24 DIAGNOSIS — M54.2 NECK PAIN: ICD-10-CM

## 2022-10-24 PROCEDURE — 99214 OFFICE O/P EST MOD 30 MIN: CPT | Performed by: PHYSICIAN ASSISTANT

## 2022-10-24 NOTE — PROGRESS NOTES
Pain Medicine Follow-Up Note    Assessment:  1  Cervical spondylosis without myelopathy    2  Neck pain    3  Bilateral occipital neuralgia    4  Myofascial pain syndrome        Plan:    While the patient was in the office today, I did have a thorough conversation regarding their chronic pain syndrome, medication management, and treatment plan options  Continue Mobic 15 mg daily and methocarbamol 750 mg p r n  spasms  Does not require refills on today's visit  I have recommended he proceed with ultrasound-guided greater occipital nerve blocks to address the occipital neuralgia type headache patterns that he is experiencing  We discussed repeating trigger point injections as needed for the myofascial features and spasm  My impressions and treatment recommendations were discussed in detail with the patient who verbalized understanding and had no further questions  Complete risks and benefits including bleeding, infection, tissue reaction, nerve injury and allergic reaction were discussed  The approach was demonstrated using models and literature was provided  Verbal and written consent was obtained  Discharge instructions were provided  I personally saw and examined the patient and I agree with the above discussed plan of care  History of Present Illness:    Sen Goel is a 49 y o  male who presents to [facility] for interval re-evaluation of the above stated pain complaints  The patient has a past medical and chronic pain history as outlined in the assessment section  He was last seen on October 6 2022 at which point he  underwent ultrasound-guided trigger point injections of the cervical paraspinals and trapezius muscles  Reported approximately 60% reduction in the myofascial component in that region    He continues to describe occipital pain and occipital neuralgia headache patterns that get worse when he is at work as he has to constantly look up and extend his neck  He denies any upper extremity radicular involvement  The patient uses ice, heat, meloxicam and methocarbamol all with minimal relief  Other than as stated above, the patient denies any interval changes in medications, medical condition, mental condition, symptoms, or allergies since the last office visit  Review of Systems:    Review of Systems      Patient Active Problem List   Diagnosis   • Cervical radiculopathy   • Neck pain   • Anxiety   • Cervical spondylosis without myelopathy       Past Medical History:   Diagnosis Date   • Anxiety    • Arthritis    • Neck pain        Past Surgical History:   Procedure Laterality Date   • NO PAST SURGERIES         Family History   Problem Relation Age of Onset   • No Known Problems Mother    • No Known Problems Father        Social History     Occupational History   • Not on file   Tobacco Use   • Smoking status: Former Smoker     Years: 10 00     Types: Cigarettes   • Smokeless tobacco: Never Used   Vaping Use   • Vaping Use: Every day   • Substances: THC, CBD   Substance and Sexual Activity   • Alcohol use:  Yes   • Drug use: Yes     Types: Marijuana     Comment: medical   • Sexual activity: Yes         Current Outpatient Medications:   •  acetaminophen (TYLENOL) 325 mg tablet, Take 650 mg by mouth every 6 (six) hours as needed for mild pain , Disp: , Rfl:   •  meloxicam (Mobic) 15 mg tablet, Take 1 tablet (15 mg total) by mouth daily, Disp: 30 tablet, Rfl: 2  •  methocarbamol (Robaxin-750) 750 mg tablet, 1 tab TID prn, Disp: 90 tablet, Rfl: 1  •  venlafaxine (EFFEXOR-XR) 75 mg 24 hr capsule, TAKE 1 CAPSULE BY MOUTH EVERY DAY WITH FOOD DO NOT TAKE WITH SKELAXIN CAUTION WITH MEDICAL MARIJUANA, Disp: 90 capsule, Rfl: 0    Allergies   Allergen Reactions   • Bee Venom Hives       Physical Exam:    /80   Pulse 73   Ht 5' 8" (1 727 m)   Wt 81 6 kg (180 lb)   BMI 27 37 kg/m²     Constitutional:normal, well developed, well nourished, alert, in no distress and non-toxic and no overt pain behavior  Eyes:anicteric  HEENT:grossly intact  Neck:supple, symmetric, trachea midline and no masses   Pulmonary:even and unlabored  Cardiovascular:No edema or pitting edema present  Skin:Normal without rashes or lesions and well hydrated  Psychiatric:Mood and affect appropriate  Neurologic:Cranial Nerves II-XII grossly intact  Musculoskeletal:  Tender bilateral occipital regions  Imaging  No orders to display         No orders of the defined types were placed in this encounter

## 2022-10-26 DIAGNOSIS — M47.812 CERVICAL SPONDYLOSIS WITHOUT MYELOPATHY: ICD-10-CM

## 2022-10-26 RX ORDER — MELOXICAM 15 MG/1
15 TABLET ORAL DAILY
Qty: 30 TABLET | Refills: 0 | Status: SHIPPED | OUTPATIENT
Start: 2022-10-26

## 2022-10-26 NOTE — TELEPHONE ENCOUNTER
S/W pt  He was given a Rx 9/30 with 2 refills  Pt notes he only has 4 pills left  Suggested he call his pharmacy to request refills  If there is a problem to call back to the office

## 2022-10-26 NOTE — TELEPHONE ENCOUNTER
Unable to close task due to message:   You must address all unsigned medications to complete this message

## 2022-11-15 ENCOUNTER — TELEPHONE (OUTPATIENT)
Dept: PAIN MEDICINE | Facility: MEDICAL CENTER | Age: 47
End: 2022-11-15

## 2022-11-15 DIAGNOSIS — M47.812 CERVICAL SPONDYLOSIS WITHOUT MYELOPATHY: ICD-10-CM

## 2022-11-15 NOTE — TELEPHONE ENCOUNTER
Caller: Uma Grande    Doctor: Eula Harry    Reason for call: Patient called asking if the office can call if theres any cancellations prior to 12/8 and 12/29 [facility]    Call back#: [phone]

## 2022-11-15 NOTE — TELEPHONE ENCOUNTER
Caller: Sharad Emerson    Doctor: Edelmira Hanson    Reason for call: Patient called stating hes having increasing pain     Nothing OTC is helping him for pain, the medications robaxin and meloxicam are not helping  And PT is not helping for his pain     Patient is having pain even with minimal activity    Call back#: [phone]

## 2022-11-15 NOTE — TELEPHONE ENCOUNTER
RN s/w pt regarding previous  Per pt he last saw [facility] in the office on 10/24  Pt has been keeping up with his robaxin, meloxicam and topical creams, ice and at times heat  Pt has been keeping his pain at a more tolerable level until 11/12  Per pt he doesn't know if its just his activity during the week that catches up with him on the weekend ot if this is something else  Per pt he had a flare up of his neck pain on the upper left side that is throbbing, waking him up during the night and nothing he does helps  Pain is an 8/10  Per pt the tension is so bad in his upper back up his neck over his head making his eyeballs hurt  RN did see that pt was recommended the [facility] per pt he is scheduled but not until December 8th and then 29th  Per pt he does not know what to do in the mean time  PT isn't helping, pt feels that has actually made him go from neck and shoulder pain to neck and headaches after doing PT  Pt was wondering about acupuncture? Will not go to chiropractor due to not wanting anyone twisting his head around  --please advise thank you--  Anything other than his meloxicam and robaxin to help with his pain?

## 2022-11-16 NOTE — TELEPHONE ENCOUNTER
Sure he can try acupuncture if he would like to  We could try a different muscle relaxant if he'd like to

## 2022-11-16 NOTE — TELEPHONE ENCOUNTER
RN s/w pt regarding previous  Per pt he did decide to use the heating pad last night and does finally feel better today  Pt is going to hold off on changing the muscle relaxer at this time and will make an appt for acupuncture      Pt appreciative of call

## 2022-11-17 NOTE — TELEPHONE ENCOUNTER
Caller: Vivian Enamorado     Doctor: Dr Radha Raygoza    Reason for call: Patient would like to speak to nurse regarding medication     Call back#: [phone]

## 2022-11-17 NOTE — TELEPHONE ENCOUNTER
RN s/w pt regarding previous  Per pt when he was on the phone with [facility] yesterday that the symptoms he was describing did sound like migraine symptoms  Per pt more than one person has said this to him and he is wondering if [facility] can order that medication  RN advised that [facility] is not in the office today but she will forward this to her to review  RN advised reaching out to his PCP to start and they might treat migraines or refer him to neurology for same      --please see previous--

## 2022-11-18 NOTE — TELEPHONE ENCOUNTER
We don't typically prescribe migraine medication but he can follow through with his PCP or neurology as suggested

## 2022-11-22 RX ORDER — MELOXICAM 15 MG/1
15 TABLET ORAL DAILY
Qty: 30 TABLET | Refills: 2 | Status: SHIPPED | OUTPATIENT
Start: 2022-11-22

## 2022-12-07 ENCOUNTER — TELEPHONE (OUTPATIENT)
Dept: PAIN MEDICINE | Facility: CLINIC | Age: 47
End: 2022-12-07

## 2022-12-07 NOTE — TELEPHONE ENCOUNTER
Patient has decided to pay out of pocket for both procedure as quoted $ 150 00 for [facility] visit  He feels he no choice, mena a lot of pain rated 10/10 for today's conversation

## 2022-12-08 ENCOUNTER — PROCEDURE VISIT (OUTPATIENT)
Dept: PAIN MEDICINE | Facility: MEDICAL CENTER | Age: 47
End: 2022-12-08

## 2022-12-08 DIAGNOSIS — M54.81 OCCIPITAL NEURALGIA OF LEFT SIDE: Primary | ICD-10-CM

## 2022-12-08 RX ORDER — LIDOCAINE HYDROCHLORIDE 10 MG/ML
4 INJECTION, SOLUTION EPIDURAL; INFILTRATION; INTRACAUDAL; PERINEURAL ONCE
Status: COMPLETED | OUTPATIENT
Start: 2022-12-08 | End: 2022-12-08

## 2022-12-08 RX ORDER — METHYLPREDNISOLONE ACETATE 40 MG/ML
40 INJECTION, SUSPENSION INTRA-ARTICULAR; INTRALESIONAL; INTRAMUSCULAR; SOFT TISSUE ONCE
Status: COMPLETED | OUTPATIENT
Start: 2022-12-08 | End: 2022-12-08

## 2022-12-08 RX ADMIN — LIDOCAINE HYDROCHLORIDE 4 ML: 10 INJECTION, SOLUTION EPIDURAL; INFILTRATION; INTRACAUDAL; PERINEURAL at 15:10

## 2022-12-08 RX ADMIN — METHYLPREDNISOLONE ACETATE 40 MG: 40 INJECTION, SUSPENSION INTRA-ARTICULAR; INTRALESIONAL; INTRAMUSCULAR; SOFT TISSUE at 15:12

## 2022-12-08 NOTE — PROGRESS NOTES
Indication: Occipital headaches  Preoperative diagnosis: Greater occipital neuralgia  Postoperative diagnosis: Greater occipital neuralgia  Procedure: Ultrasound-guided left greater occipital nerve block  After discussing the risks, benefits, and alternatives to the procedure, the patient expressed understanding and wished to proceed  The patient was brought to the procedure suite and placed in the prone position  A procedural pause was conducted to verify: correct patient identity, procedure to be performed and as applicable, correct side and site, correct patient position, and availability of implants, special equipment or special requirements  A simple surgical tray was used  Prior to the procedure, the upper cervical spine was examined with a 12 MHz linear transducer to visualize the spinous process, suboccipital musculature, greater occipital nerve, occipital artery, and to determine the optimal needle path  Following this, the area was prepared with a ChloraPrep scrub, then re-examined using the same transducer, a sterile ultrasound transducer cover, and sterile ultrasound transducer gel  Thereafter, using continuous ultrasound guidance, a 2 5-inch 25-gauge needle was advanced in close proximity to the greater occipital nerve  After visualization of the tip and negative aspiration for blood, a mixture of 20 mg of Depo-Medrol in 2 cc of 0 25% bupivacaine was injected into the target site  The patient tolerated the procedure well and there were no apparent complications  After an appropriate amount of observation, the patient was dismissed from the clinic in good condition under their own power

## 2022-12-15 ENCOUNTER — TELEPHONE (OUTPATIENT)
Dept: PAIN MEDICINE | Facility: CLINIC | Age: 47
End: 2022-12-15

## 2022-12-29 ENCOUNTER — PROCEDURE VISIT (OUTPATIENT)
Dept: PAIN MEDICINE | Facility: MEDICAL CENTER | Age: 47
End: 2022-12-29

## 2022-12-29 DIAGNOSIS — M54.81 OCCIPITAL NEURALGIA OF RIGHT SIDE: Primary | ICD-10-CM

## 2022-12-29 RX ORDER — ROPIVACAINE HYDROCHLORIDE 5 MG/ML
20 INJECTION, SOLUTION EPIDURAL; INFILTRATION; PERINEURAL ONCE
Status: COMPLETED | OUTPATIENT
Start: 2022-12-29 | End: 2022-12-29

## 2022-12-29 RX ORDER — METHYLPREDNISOLONE ACETATE 40 MG/ML
40 INJECTION, SUSPENSION INTRA-ARTICULAR; INTRALESIONAL; INTRAMUSCULAR; SOFT TISSUE ONCE
Status: COMPLETED | OUTPATIENT
Start: 2022-12-29 | End: 2022-12-29

## 2022-12-29 RX ADMIN — ROPIVACAINE HYDROCHLORIDE 20 ML: 5 INJECTION, SOLUTION EPIDURAL; INFILTRATION; PERINEURAL at 14:32

## 2022-12-29 RX ADMIN — METHYLPREDNISOLONE ACETATE 40 MG: 40 INJECTION, SUSPENSION INTRA-ARTICULAR; INTRALESIONAL; INTRAMUSCULAR; SOFT TISSUE at 14:32

## 2022-12-29 NOTE — PROGRESS NOTES
Indication: Occipital headaches  Preoperative diagnosis: Greater occipital neuralgia  Postoperative diagnosis: Greater occipital neuralgia  Procedure: Ultrasound-guided right greater occipital nerve block  After discussing the risks, benefits, and alternatives to the procedure, the patient expressed understanding and wished to proceed  The patient was brought to the procedure suite and placed in the prone position  A procedural pause was conducted to verify: correct patient identity, procedure to be performed and as applicable, correct side and site, correct patient position, and availability of implants, special equipment or special requirements  A simple surgical tray was used  Prior to the procedure, the upper cervical spine was examined with a 12 MHz linear transducer to visualize the spinous process, suboccipital musculature, greater occipital nerve, occipital artery, and to determine the optimal needle path  Following this, the area was prepared with a ChloraPrep scrub, then re-examined using the same transducer, a sterile ultrasound transducer cover, and sterile ultrasound transducer gel  Thereafter, using continuous ultrasound guidance, a 2 5-inch 25-gauge needle was advanced in close proximity to the greater occipital nerve  After visualization of the tip and negative aspiration for blood, a mixture of 20 mg of Depo-Medrol in 2 cc of 0 5% ropivacaine was injected into the target site  The patient tolerated the procedure well and there were no apparent complications  After an appropriate amount of observation, the patient was dismissed from the clinic in good condition under their own power

## 2023-01-05 ENCOUNTER — TELEPHONE (OUTPATIENT)
Dept: PAIN MEDICINE | Facility: CLINIC | Age: 48
End: 2023-01-05

## 2023-01-05 NOTE — TELEPHONE ENCOUNTER
Caller: Yesica Rice best contact calling with patient     Doctor: Gogo Blood    Reason for call: needs nurse to help answer questions for patient       Call back#:  [phone]

## 2023-01-16 ENCOUNTER — OFFICE VISIT (OUTPATIENT)
Dept: FAMILY MEDICINE CLINIC | Facility: CLINIC | Age: 48
End: 2023-01-16

## 2023-01-16 VITALS
DIASTOLIC BLOOD PRESSURE: 78 MMHG | HEIGHT: 68 IN | BODY MASS INDEX: 28.64 KG/M2 | WEIGHT: 189 LBS | OXYGEN SATURATION: 98 % | SYSTOLIC BLOOD PRESSURE: 146 MMHG | HEART RATE: 80 BPM

## 2023-01-16 DIAGNOSIS — M54.81 OCCIPITAL NEURALGIA, UNSPECIFIED LATERALITY: ICD-10-CM

## 2023-01-16 DIAGNOSIS — M47.812 CERVICAL SPONDYLOSIS WITHOUT MYELOPATHY: ICD-10-CM

## 2023-01-16 DIAGNOSIS — G44.86 CERVICOGENIC HEADACHE: Primary | ICD-10-CM

## 2023-01-16 PROBLEM — R51.9 HEADACHE: Status: ACTIVE | Noted: 2023-01-16

## 2023-01-16 PROBLEM — M54.12 CERVICAL RADICULOPATHY: Status: RESOLVED | Noted: 2021-07-15 | Resolved: 2023-01-16

## 2023-01-16 PROBLEM — E78.00 HYPERCHOLESTEROLEMIA: Status: ACTIVE | Noted: 2023-01-16

## 2023-01-16 RX ORDER — GABAPENTIN 100 MG/1
100 CAPSULE ORAL
Qty: 90 CAPSULE | Refills: 0 | Status: SHIPPED | OUTPATIENT
Start: 2023-01-16 | End: 2023-01-26

## 2023-01-16 NOTE — PATIENT INSTRUCTIONS
Reviewed health history along with medication  He does have ongoing significant cervical dorsal pain, does not radiate into arms, does have occipital neuralgia  No prior history migraines, has had headaches for about 2 years now  Appear cervicogenic  Had tried what sounds like Imitrex or Maxalt few years ago without much benefit, does have some benefit with methocarbamol, meloxicam, can use those as needed  I would like him to try gabapentin 100 mg at bedtime, update me in 2 to 3 weeks, consider titrating up to 200/300 mg at bedtime  Watch for mental fogginess in the morning  He also does use medical cannabis at night  He did note roughly 70% improvement with occipital nerve block, he will continue to see pain management, see previous MRI cervical spine from June 2021, does have C3-4 moderate right foraminal stenosis, disc desiccation with mild bilateral foraminal stenosis C6-7  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

## 2023-01-16 NOTE — PROGRESS NOTES
FAMILY PRACTICE OFFICE VISIT  [facility]  [address]  [address], [place], [address]      NAME: Lane Guzman  AGE: 49 y o  SEX: male  : 1975   MRN: [id number]    DATE: 2023  TIME: 4:46 PM    Assessment and Plan     Problem List Items Addressed This Visit     Headache - Primary    Relevant Medications    gabapentin (Neurontin) 100 mg capsule    Other Relevant Orders    Ambulatory Referral to Neurology    Occipital neuralgia of left side    Relevant Medications    gabapentin (Neurontin) 100 mg capsule    Cervical spondylosis without myelopathy    Relevant Medications    gabapentin (Neurontin) 100 mg capsule       Patient Instructions   Reviewed health history along with medication  He does have ongoing significant cervical dorsal pain, does not radiate into arms, does have occipital neuralgia  No prior history migraines, has had headaches for about 2 years now  Appear cervicogenic  Had tried what sounds like Imitrex or Maxalt few years ago without much benefit, does have some benefit with methocarbamol, meloxicam, can use those as needed  I would like him to try gabapentin 100 mg at bedtime, update me in 2 to 3 weeks, consider titrating up to 200/300 mg at bedtime  Watch for mental fogginess in the morning  He also does use medical cannabis at night  He did note roughly 70% improvement with occipital nerve block, he will continue to see pain management, see previous MRI cervical spine from 2021, does have C3-4 moderate right foraminal stenosis, disc desiccation with mild bilateral foraminal stenosis C6-7  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, work  Also has some left shoulder degeneration starting  He can see neurology, consider Botox injection, defer other imaging to them, no red flags here today  Await update in few weeks, he should set up a routine physical with us    He did have blood work in 2021, that looked okay other than mild elevation cholesterol  Recheck blood pressure at follow-up  He will also continue Effexor 75 mg daily  Chief Complaint     Chief Complaint   Patient presents with   • Headache     Headache start on the back of head start with problems with neck and shoulders pain         History of Present Illness   Lianne Arrington is a 49y o -year-old male who I am meeting for the first time, he wants to discuss ongoing neck pain along with headaches  He has had ongoing bilateral cervical dorsal pain with radiation into occiput, does see pain management, has received nerve block/injections, had MRI back in June 2021  Played hockey for about 20 years, retired from [employer], works for an [employer]  Has no pain down arms, does note headaches radiate to bitemporal/biparietal area, pain behind eyes  No history of migraines, did try his wife's migraine medicine about 18 months ago, unclear if it helped  Also has left shoulder pain  Is on Effexor for anxiety      Review of Systems   Review of Systems   Constitutional: Negative for appetite change, fatigue, fever and unexpected weight change  HENT: Negative for sore throat and trouble swallowing  Respiratory: Negative for cough, chest tightness, shortness of breath and wheezing  Cardiovascular: Negative for chest pain, palpitations and leg swelling  Gastrointestinal: Negative for abdominal pain, blood in stool, nausea and vomiting  No acid reflux     No change in bowel   Genitourinary: Negative for dysuria and hematuria  Musculoskeletal: Positive for neck pain and neck stiffness  Some left shoulder pain   Neurological: Positive for headaches (x 2 yrs or so -    left > right neck pain into occiput, radiates b/l parietal to behind eyes  No hx migraine or HA before 2 yrs ago  No aura  No radiation down arm)  Negative for dizziness, syncope and light-headedness     Psychiatric/Behavioral: Negative for behavioral problems and confusion  The patient is nervous/anxious  Active Problem List     Patient Active Problem List   Diagnosis   • Neck pain   • Anxiety   • Cervical spondylosis without myelopathy   • Occipital neuralgia of left side   • Headache   • Hypercholesterolemia       Past Medical History:  Reviewed    Past Surgical History:  Reviewed    Family History:  Reviewed    Social History:  Reviewed    Objective     Vitals:    01/16/23 0857   BP: 146/78   BP Location: Left arm   Patient Position: Sitting   Cuff Size: Large   Pulse: 80   SpO2: 98%   Weight: 85 7 kg (189 lb)   Height: 5' 8" (1 727 m)     Body mass index is 28 74 kg/m²  BP Readings from Last 3 Encounters:   01/16/23 146/78   10/24/22 123/80   09/29/22 122/66       Wt Readings from Last 3 Encounters:   01/16/23 85 7 kg (189 lb)   10/24/22 81 6 kg (180 lb)   09/29/22 81 6 kg (180 lb)       Physical Exam  Constitutional:       Comments: 58-year-old male, looks healthy, is somewhat anxious  Does have significant tenderness bilateral cervical dorsal, no focal weakness in arms  Neurologically appears intact  HENT:      Right Ear: Tympanic membrane normal       Left Ear: Tympanic membrane normal    Eyes:      General: No scleral icterus  Neck:      Vascular: No carotid bruit  Cardiovascular:      Rate and Rhythm: Normal rate and regular rhythm  Heart sounds: Normal heart sounds  No murmur heard  Pulmonary:      Effort: Pulmonary effort is normal  No respiratory distress  Breath sounds: No wheezing, rhonchi or rales  Abdominal:      Tenderness: There is no abdominal tenderness  Musculoskeletal:      Right lower leg: No edema  Left lower leg: No edema  Lymphadenopathy:      Cervical: No cervical adenopathy  Skin:     Coloration: Skin is not jaundiced  Neurological:      General: No focal deficit present           ALLERGIES:  Allergies   Allergen Reactions   • Bee Venom Hives       Current Medications     Current Outpatient Medications   Medication Sig Dispense Refill   • gabapentin (Neurontin) 100 mg capsule Take 1 capsule (100 mg total) by mouth daily at bedtime 90 capsule 0   • meloxicam (Mobic) 15 mg tablet Take 1 tablet (15 mg total) by mouth daily 30 tablet 2   • methocarbamol (Robaxin-750) 750 mg tablet 1 tab TID prn 90 tablet 1   • NON FORMULARY Uses medical cannabis in PM     • venlafaxine (EFFEXOR-XR) 75 mg 24 hr capsule TAKE 1 CAPSULE BY MOUTH EVERY DAY WITH FOOD DO NOT TAKE WITH SKELAXIN CAUTION WITH MEDICAL MARIJUANA 90 capsule 0   • acetaminophen (TYLENOL) 325 mg tablet Take 650 mg by mouth every 6 (six) hours as needed for mild pain        No current facility-administered medications for this visit              Orders Placed This Encounter   Procedures   • Ambulatory Referral to Neurology         Niki Giron DO

## 2023-01-26 ENCOUNTER — TELEPHONE (OUTPATIENT)
Dept: FAMILY MEDICINE CLINIC | Facility: CLINIC | Age: 48
End: 2023-01-26

## 2023-01-26 DIAGNOSIS — M47.812 CERVICAL SPONDYLOSIS WITHOUT MYELOPATHY: ICD-10-CM

## 2023-01-26 DIAGNOSIS — M54.81 OCCIPITAL NEURALGIA, UNSPECIFIED LATERALITY: ICD-10-CM

## 2023-01-26 DIAGNOSIS — G44.86 CERVICOGENIC HEADACHE: ICD-10-CM

## 2023-01-26 RX ORDER — GABAPENTIN 100 MG/1
CAPSULE ORAL
Qty: 90 CAPSULE | Refills: 0
Start: 2023-01-26 | End: 2023-03-08 | Stop reason: SDUPTHER

## 2023-01-26 NOTE — TELEPHONE ENCOUNTER
Please call him back, he can increase to gabapentin 100 mg 2 pills daily, he can split that dose into 2 separate times, if needed he can take up to 3 pills every day for neck pain/headache    Let us know when new prescription is needed

## 2023-01-26 NOTE — TELEPHONE ENCOUNTER
Patient called with a question to Dr Mai Grayson  Patient explained that Dr Mai Grayson has prescribed to him Gabapentin 100 mg capsules to take 1 capsule daily which he states has been taking and feeling that it is helping  However, he also states that his head starts to hurts on evenings  So the question is: Would Dr Mai Grayson increase the medication dose? Please advise  Thank you!

## 2023-01-30 ENCOUNTER — OFFICE VISIT (OUTPATIENT)
Dept: PAIN MEDICINE | Facility: MEDICAL CENTER | Age: 48
End: 2023-01-30

## 2023-01-30 VITALS
HEART RATE: 73 BPM | DIASTOLIC BLOOD PRESSURE: 80 MMHG | HEIGHT: 68 IN | SYSTOLIC BLOOD PRESSURE: 124 MMHG | WEIGHT: 190 LBS | OXYGEN SATURATION: 98 % | BODY MASS INDEX: 28.79 KG/M2

## 2023-01-30 DIAGNOSIS — M79.18 MYOFASCIAL PAIN SYNDROME: Primary | ICD-10-CM

## 2023-01-30 DIAGNOSIS — M54.81 OCCIPITAL NEURALGIA, UNSPECIFIED LATERALITY: ICD-10-CM

## 2023-01-30 NOTE — PROGRESS NOTES
Assessment:  1  Myofascial pain syndrome    2  Occipital neuralgia, unspecified laterality        Plan:  1  Doing better since starting gabapentin with Dr Yanique Vail  Agree with continuing this and titrating up as needed/indicated  2  Can follow up with us as needed for interventional options  My impressions and treatment recommendations were discussed in detail with the patient who verbalized understanding and had no further questions  Discharge instructions were provided  I personally saw and examined the patient and I agree with the above discussed plan of care  No orders of the defined types were placed in this encounter  No orders of the defined types were placed in this encounter  History of Present Illness:  Cash Lay is a 49 y o  male who presents for a follow up office visit in regards to Neck Pain  The patient’s current symptoms include occipital neuralgia, occipital headache and associated myofascial pain all which have improved with injections and especially recent addition of gabapentin per Dr Yanique Vail  Currently rating his pain as a 3-4/10  Pain is a dull aching sensation as well as throbbing  Gabapentin providing at least 60% relief of his symptoms currently  Only mild to moderate functional disability at this point  He did try acupuncture but found that this was leading to worsening headaches  I have personally reviewed and/or updated the patient's past medical history, past surgical history, family history, social history, current medications, allergies, and vital signs today  Review of Systems   Respiratory: Negative for shortness of breath  Cardiovascular: Negative for chest pain  Gastrointestinal: Negative for constipation, diarrhea, nausea and vomiting  Musculoskeletal: Positive for myalgias, neck pain and neck stiffness  Negative for arthralgias, gait problem and joint swelling  Skin: Negative for rash  Neurological: Positive for headaches   Negative for dizziness, seizures and weakness  All other systems reviewed and are negative  Patient Active Problem List   Diagnosis   • Neck pain   • Anxiety   • Cervical spondylosis without myelopathy   • Occipital neuralgia of left side   • Headache   • Hypercholesterolemia       Past Medical History:   Diagnosis Date   • Anxiety    • Arthritis    • Cervical radiculopathy 7/15/2021    Referred to pain management in July 2021 - and will be getting nerve root injections with possible ablation / NAGA   • Neck pain        Past Surgical History:   Procedure Laterality Date   • NO PAST SURGERIES         Family History   Problem Relation Age of Onset   • No Known Problems Mother    • No Known Problems Father        Social History     Occupational History   • Not on file   Tobacco Use   • Smoking status: Former     Years: 10 00     Types: Cigarettes   • Smokeless tobacco: Never   Vaping Use   • Vaping Use: Every day   • Substances: THC, CBD   Substance and Sexual Activity   • Alcohol use: Yes   • Drug use: Yes     Types: Marijuana     Comment: medical   • Sexual activity: Yes       Current Outpatient Medications on File Prior to Visit   Medication Sig   • acetaminophen (TYLENOL) 325 mg tablet Take 650 mg by mouth every 6 (six) hours as needed for mild pain    • gabapentin (Neurontin) 100 mg capsule Use 1 pill twice daily, can increase to 3 pills daily if needed  • meloxicam (Mobic) 15 mg tablet Take 1 tablet (15 mg total) by mouth daily   • methocarbamol (Robaxin-750) 750 mg tablet 1 tab TID prn   • NON FORMULARY Uses medical cannabis in PM   • venlafaxine (EFFEXOR-XR) 75 mg 24 hr capsule TAKE 1 CAPSULE BY MOUTH EVERY DAY WITH FOOD DO NOT TAKE WITH SKELAXIN CAUTION WITH MEDICAL MARIJUANA     No current facility-administered medications on file prior to visit         Allergies   Allergen Reactions   • Bee Venom Hives       Physical Exam:    /80   Pulse 73   Ht 5' 8" (1 727 m)   Wt 86 2 kg (190 lb)   SpO2 98%   BMI 28 89 kg/m²     Constitutional:normal, well developed, well nourished, alert, in no distress and non-toxic and no overt pain behavior    Eyes:anicteric  HEENT:grossly intact  Neck:supple, symmetric, trachea midline and no masses   Pulmonary:even and unlabored  Cardiovascular:No edema or pitting edema present  Skin:Normal without rashes or lesions and well hydrated  Psychiatric:Mood and affect appropriate  Neurologic:Cranial Nerves II-XII grossly intact  Musculoskeletal:normal, only mild tenderness about the left medial scapular border and mild trap tenderness bilaterally on palpation    Imaging

## 2023-02-13 DIAGNOSIS — F32.A ANXIETY AND DEPRESSION: ICD-10-CM

## 2023-02-13 DIAGNOSIS — F41.9 ANXIETY AND DEPRESSION: ICD-10-CM

## 2023-02-13 RX ORDER — VENLAFAXINE HYDROCHLORIDE 75 MG/1
CAPSULE, EXTENDED RELEASE ORAL
Qty: 90 CAPSULE | Refills: 0 | Status: SHIPPED | OUTPATIENT
Start: 2023-02-13

## 2023-03-08 DIAGNOSIS — M47.812 CERVICAL SPONDYLOSIS WITHOUT MYELOPATHY: ICD-10-CM

## 2023-03-08 DIAGNOSIS — M54.81 OCCIPITAL NEURALGIA, UNSPECIFIED LATERALITY: ICD-10-CM

## 2023-03-08 DIAGNOSIS — G44.86 CERVICOGENIC HEADACHE: ICD-10-CM

## 2023-03-09 RX ORDER — GABAPENTIN 100 MG/1
CAPSULE ORAL
Qty: 90 CAPSULE | Refills: 0 | Status: SHIPPED | OUTPATIENT
Start: 2023-03-09

## 2023-04-26 NOTE — PATIENT INSTRUCTIONS
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, work  Also has some left shoulder degeneration starting  He will continue Venlafaxine 75 mg once daily, is helping, also does use medical cannabis at night at times  We did review previous blood work from Sept 2021-  CBC/CMP/ TSH were fine,    Lipid screening showed chol 224 w HDL 58,   He is up to date with Diabetes screening  Glucose was 94  Can redo fasting lipids plus CMP    Immunization History   Administered Date(s) Administered    COVID-19 MODERNA VACC 0 5 ML IM 04/26/2021, 06/02/2021    Tdap 09/01/2021     He does not do yearly Flu shot  Tdap/tetanus shot is up to date  (done every 10 yrs for superficial cuts, every 5 yrs for deep wounds)  Covid vaccine rcvd    He quit smoking 15-20 yrs ago -  smoked about 10 yrs     Regarding Colon Cancer screening, we discussed Colonoscopy vs ColoGuard is indicated starting at age 36-53  Will do ColoGuard  Discussed Prostate Cancer screening pros/cons starting at age 48  PSA blood test not indicated  Continue to try to watch healthy diet, exercise routinely  We will see him again in 12 months, sooner as needed

## 2023-04-27 ENCOUNTER — APPOINTMENT (OUTPATIENT)
Dept: LAB | Facility: MEDICAL CENTER | Age: 48
End: 2023-04-27

## 2023-04-27 ENCOUNTER — OFFICE VISIT (OUTPATIENT)
Dept: FAMILY MEDICINE CLINIC | Facility: CLINIC | Age: 48
End: 2023-04-27

## 2023-04-27 VITALS
HEART RATE: 82 BPM | TEMPERATURE: 97.8 F | BODY MASS INDEX: 27.85 KG/M2 | SYSTOLIC BLOOD PRESSURE: 118 MMHG | HEIGHT: 69 IN | DIASTOLIC BLOOD PRESSURE: 74 MMHG | WEIGHT: 188 LBS | OXYGEN SATURATION: 98 %

## 2023-04-27 DIAGNOSIS — M47.812 CERVICAL SPONDYLOSIS WITHOUT MYELOPATHY: ICD-10-CM

## 2023-04-27 DIAGNOSIS — F41.9 ANXIETY: ICD-10-CM

## 2023-04-27 DIAGNOSIS — M54.81 OCCIPITAL NEURALGIA OF LEFT SIDE: ICD-10-CM

## 2023-04-27 DIAGNOSIS — G44.86 CERVICOGENIC HEADACHE: ICD-10-CM

## 2023-04-27 DIAGNOSIS — E78.00 HYPERCHOLESTEROLEMIA: ICD-10-CM

## 2023-04-27 DIAGNOSIS — Z00.00 ENCOUNTER FOR ANNUAL PHYSICAL EXAM: Primary | ICD-10-CM

## 2023-04-27 DIAGNOSIS — Z12.11 COLON CANCER SCREENING: ICD-10-CM

## 2023-04-27 LAB
ALBUMIN SERPL BCP-MCNC: 4.1 G/DL (ref 3.5–5)
ALP SERPL-CCNC: 82 U/L (ref 46–116)
ALT SERPL W P-5'-P-CCNC: 26 U/L (ref 12–78)
ANION GAP SERPL CALCULATED.3IONS-SCNC: 0 MMOL/L (ref 4–13)
AST SERPL W P-5'-P-CCNC: 21 U/L (ref 5–45)
BILIRUB SERPL-MCNC: 0.31 MG/DL (ref 0.2–1)
BUN SERPL-MCNC: 17 MG/DL (ref 5–25)
CALCIUM SERPL-MCNC: 8.9 MG/DL (ref 8.3–10.1)
CHLORIDE SERPL-SCNC: 110 MMOL/L (ref 96–108)
CHOLEST SERPL-MCNC: 219 MG/DL
CO2 SERPL-SCNC: 28 MMOL/L (ref 21–32)
CREAT SERPL-MCNC: 1.13 MG/DL (ref 0.6–1.3)
GFR SERPL CREATININE-BSD FRML MDRD: 76 ML/MIN/1.73SQ M
GLUCOSE P FAST SERPL-MCNC: 99 MG/DL (ref 65–99)
HDLC SERPL-MCNC: 57 MG/DL
LDLC SERPL CALC-MCNC: 153 MG/DL (ref 0–100)
NONHDLC SERPL-MCNC: 162 MG/DL
POTASSIUM SERPL-SCNC: 4.4 MMOL/L (ref 3.5–5.3)
PROT SERPL-MCNC: 7.3 G/DL (ref 6.4–8.4)
SODIUM SERPL-SCNC: 138 MMOL/L (ref 135–147)
TRIGL SERPL-MCNC: 44 MG/DL

## 2023-04-27 RX ORDER — MELOXICAM 15 MG/1
15 TABLET ORAL DAILY PRN
Qty: 30 TABLET | Refills: 2 | Status: SHIPPED | OUTPATIENT
Start: 2023-04-27

## 2023-04-27 RX ORDER — VENLAFAXINE HYDROCHLORIDE 75 MG/1
75 CAPSULE, EXTENDED RELEASE ORAL DAILY
Qty: 90 CAPSULE | Refills: 3
Start: 2023-04-27

## 2023-04-27 NOTE — PROGRESS NOTES
BMI Counseling: Body mass index is 27 76 kg/m²  The BMI is above normal  Nutrition recommendations include encouraging healthy choices of fruits and vegetables  Rationale for BMI follow-up plan is due to patient being overweight or obese  Depression Screening and Follow-up Plan: Patient was screened for depression during today's encounter  They screened negative with a PHQ-2 score of 0       FAMILY PRACTICE OFFICE VISIT  [facility]  [address]  [address], [place], [address]      NAME: Tobey Moritz  AGE: 49 y o  SEX: male  : 1975   MRN: [id number]    DATE: 2023  TIME: 9:40 AM    Assessment and Plan     Problem List Items Addressed This Visit     Headache    Occipital neuralgia of left side    Cervical spondylosis without myelopathy    Relevant Medications    meloxicam (Mobic) 15 mg tablet    Anxiety    Relevant Medications    venlafaxine (EFFEXOR-XR) 75 mg 24 hr capsule    Hypercholesterolemia    Relevant Orders    Comprehensive metabolic panel    Lipid panel   Other Visit Diagnoses     Encounter for annual physical exam    -  Primary    Colon cancer screening        Relevant Orders    Cologuard    BMI 27 0-27 9,adult              Patient Instructions     Overall doing well here today  Gabapentin has been helping-   uses 200 mg at night, occ uses 300  Continue as is  Did find some benefit with Meloxicam 15 mg at times in the past, can use daily as needed, do not mix with Ibuprofen/Aleve  Does use methocarbamol as muscle relaxant at times  Saw Pain Mngmnt in f/up late -   He prev did note roughly 70% improvement with occipital nerve block, see pain management as needed, see previous MRI cervical spine from 2021, does have C3-4 moderate right foraminal stenosis, disc desiccation with mild bilateral foraminal stenosis C6-7  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, work    Also has some left shoulder degeneration starting  He will continue Venlafaxine 75 mg once daily, is helping, also does use medical cannabis at night at times  We did review previous blood work from Sept 2021-  CBC/CMP/ TSH were fine,    Lipid screening showed chol 224 w HDL 58,   He is up to date with Diabetes screening  Glucose was 94  Can redo fasting lipids plus CMP    Immunization History   Administered Date(s) Administered   • COVID-19 MODERNA VACC 0 5 ML IM 04/26/2021, 06/02/2021   • Tdap 09/01/2021     He does not do yearly Flu shot  Tdap/tetanus shot is up to date  (done every 10 yrs for superficial cuts, every 5 yrs for deep wounds)  Covid vaccine rcvd    He quit smoking 15-20 yrs ago -  smoked about 10 yrs     Regarding Colon Cancer screening, we discussed Colonoscopy vs ColoGuard is indicated starting at age 36-53  Will do ColoGuard  Discussed Prostate Cancer screening pros/cons starting at age 48  PSA blood test not indicated  Continue to try to watch healthy diet, exercise routinely  We will see him again in 12 months, sooner as needed  Chief Complaint     Chief Complaint   Patient presents with   • Physical Exam       History of Present Illness   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 who is in for a routine physical/follow-up  He has had a great response to gabapentin, using 200 mg at night, occasionally uses 300 mg  Sleeps well, headaches much improved, occipital neuralgia much improved  Uses methocarbamol at times, has used meloxicam with some benefit in the past   Feels venlafaxine has helped with mood    No longer plays competitive hockey but does remain active, does stretching, tries to exercise      Review of Systems   Review of Systems   Constitutional: Negative for appetite change, fatigue, fever and unexpected weight change  HENT: Negative for sore throat and trouble swallowing  Respiratory: Negative for cough, chest tightness, shortness of breath and wheezing  "  Cardiovascular: Negative for chest pain, palpitations and leg swelling  Gastrointestinal: Negative for abdominal pain, blood in stool, nausea and vomiting  No acid reflux     No change in bowel   Genitourinary: Negative for dysuria and hematuria  Musculoskeletal: Positive for neck pain (Improved)  Neurological: Positive for headaches (Improved, none recently)  Negative for dizziness, syncope and light-headedness  Psychiatric/Behavioral: Positive for sleep disturbance (Improved)  Negative for behavioral problems and confusion  Active Problem List     Patient Active Problem List   Diagnosis   • Neck pain   • Anxiety   • Cervical spondylosis without myelopathy   • Occipital neuralgia of left side   • Headache   • Hypercholesterolemia       Past Medical History:  Reviewed    Past Surgical History:  Reviewed    Family History:  Reviewed    Social History:  Reviewed    Objective     Vitals:    04/27/23 0900   BP: 118/74   BP Location: Left arm   Patient Position: Sitting   Cuff Size: Large   Pulse: 82   Temp: 97 8 °F (36 6 °C)   SpO2: 98%   Weight: 85 3 kg (188 lb)   Height: 5' 9\" (1 753 m)     Body mass index is 27 76 kg/m²  BP Readings from Last 3 Encounters:   04/27/23 118/74   01/30/23 124/80   01/16/23 146/78       Wt Readings from Last 3 Encounters:   04/27/23 85 3 kg (188 lb)   01/30/23 86 2 kg (190 lb)   01/16/23 85 7 kg (189 lb)       Physical Exam  Constitutional:       General: He is not in acute distress  Appearance: Normal appearance  He is well-developed  He is not ill-appearing  HENT:      Right Ear: Tympanic membrane normal       Left Ear: Tympanic membrane normal       Mouth/Throat:      Pharynx: Oropharynx is clear  Eyes:      General: No scleral icterus  Neck:      Vascular: No carotid bruit  Cardiovascular:      Rate and Rhythm: Normal rate and regular rhythm  Heart sounds: Normal heart sounds  No murmur heard    Pulmonary:      Effort: Pulmonary effort is " normal  No respiratory distress  Breath sounds: Normal breath sounds  No wheezing, rhonchi or rales  Abdominal:      Palpations: Abdomen is soft  Tenderness: There is no abdominal tenderness  Musculoskeletal:      Right lower leg: No edema  Left lower leg: No edema  Lymphadenopathy:      Cervical: No cervical adenopathy  Skin:     Coloration: Skin is not jaundiced  Neurological:      Mental Status: He is alert and oriented to person, place, and time  Mental status is at baseline  Psychiatric:         Mood and Affect: Mood normal          Behavior: Behavior normal          ALLERGIES:  Allergies   Allergen Reactions   • Bee Venom Hives       Current Medications     Current Outpatient Medications   Medication Sig Dispense Refill   • gabapentin (NEURONTIN) 100 mg capsule TAKE 1 CAPSULE BY MOUTH TWICE A DAY *MAY INCREASE TO 3 TIMES A DAY IF NEEDED* 90 capsule 3   • meloxicam (Mobic) 15 mg tablet Take 1 tablet (15 mg total) by mouth daily as needed (pain) 30 tablet 2   • venlafaxine (EFFEXOR-XR) 75 mg 24 hr capsule Take 1 capsule (75 mg total) by mouth daily 90 capsule 3   • acetaminophen (TYLENOL) 325 mg tablet Take 650 mg by mouth every 6 (six) hours as needed for mild pain  (Patient not taking: Reported on 4/27/2023)     • methocarbamol (Robaxin-750) 750 mg tablet 1 tab TID prn 90 tablet 1   • NON FORMULARY Uses medical cannabis in PM       No current facility-administered medications for this visit              Orders Placed This Encounter   Procedures   • Cologuard   • Comprehensive metabolic panel   • Lipid panel         Janette Vargas DO

## 2023-05-10 ENCOUNTER — TELEPHONE (OUTPATIENT)
Dept: NEUROLOGY | Facility: CLINIC | Age: 48
End: 2023-05-10

## 2023-05-10 NOTE — TELEPHONE ENCOUNTER
I called patient LVM to call back to scheduled a new patient appointment with Dr Nenita Carter in [facility] for Headache

## 2023-05-14 DIAGNOSIS — F41.9 ANXIETY: ICD-10-CM

## 2023-05-14 RX ORDER — VENLAFAXINE HYDROCHLORIDE 75 MG/1
CAPSULE, EXTENDED RELEASE ORAL
Qty: 90 CAPSULE | Refills: 3 | Status: SHIPPED | OUTPATIENT
Start: 2023-05-14

## 2023-08-21 DIAGNOSIS — M47.812 CERVICAL SPONDYLOSIS WITHOUT MYELOPATHY: ICD-10-CM

## 2023-08-21 DIAGNOSIS — M54.81 OCCIPITAL NEURALGIA, UNSPECIFIED LATERALITY: ICD-10-CM

## 2023-08-21 DIAGNOSIS — G44.86 CERVICOGENIC HEADACHE: ICD-10-CM

## 2023-08-22 RX ORDER — GABAPENTIN 100 MG/1
CAPSULE ORAL
Qty: 90 CAPSULE | Refills: 0 | Status: SHIPPED | OUTPATIENT
Start: 2023-08-22

## 2023-08-31 ENCOUNTER — VBI (OUTPATIENT)
Dept: ADMINISTRATIVE | Facility: OTHER | Age: 48
End: 2023-08-31

## 2023-10-05 DIAGNOSIS — M47.812 CERVICAL SPONDYLOSIS WITHOUT MYELOPATHY: ICD-10-CM

## 2023-10-05 DIAGNOSIS — G44.86 CERVICOGENIC HEADACHE: ICD-10-CM

## 2023-10-05 DIAGNOSIS — M54.81 OCCIPITAL NEURALGIA, UNSPECIFIED LATERALITY: ICD-10-CM

## 2023-10-05 RX ORDER — GABAPENTIN 100 MG/1
CAPSULE ORAL
Qty: 90 CAPSULE | Refills: 0 | Status: SHIPPED | OUTPATIENT
Start: 2023-10-05

## 2023-11-10 ENCOUNTER — VBI (OUTPATIENT)
Dept: ADMINISTRATIVE | Facility: OTHER | Age: 48
End: 2023-11-10

## 2023-11-17 DIAGNOSIS — M54.81 OCCIPITAL NEURALGIA, UNSPECIFIED LATERALITY: ICD-10-CM

## 2023-11-17 DIAGNOSIS — G44.86 CERVICOGENIC HEADACHE: ICD-10-CM

## 2023-11-17 DIAGNOSIS — M47.812 CERVICAL SPONDYLOSIS WITHOUT MYELOPATHY: ICD-10-CM

## 2023-11-20 RX ORDER — GABAPENTIN 100 MG/1
CAPSULE ORAL
Qty: 90 CAPSULE | Refills: 0 | Status: SHIPPED | OUTPATIENT
Start: 2023-11-20

## 2024-01-11 DIAGNOSIS — M54.81 OCCIPITAL NEURALGIA, UNSPECIFIED LATERALITY: ICD-10-CM

## 2024-01-11 DIAGNOSIS — M47.812 CERVICAL SPONDYLOSIS WITHOUT MYELOPATHY: ICD-10-CM

## 2024-01-11 DIAGNOSIS — G44.86 CERVICOGENIC HEADACHE: ICD-10-CM

## 2024-01-11 RX ORDER — GABAPENTIN 100 MG/1
CAPSULE ORAL
Qty: 90 CAPSULE | Refills: 0 | Status: SHIPPED | OUTPATIENT
Start: 2024-01-11

## 2024-02-23 DIAGNOSIS — M47.812 CERVICAL SPONDYLOSIS WITHOUT MYELOPATHY: ICD-10-CM

## 2024-02-23 DIAGNOSIS — M54.81 OCCIPITAL NEURALGIA, UNSPECIFIED LATERALITY: ICD-10-CM

## 2024-02-23 DIAGNOSIS — G44.86 CERVICOGENIC HEADACHE: ICD-10-CM

## 2024-02-24 RX ORDER — GABAPENTIN 100 MG/1
CAPSULE ORAL
Qty: 180 CAPSULE | Refills: 0 | Status: SHIPPED | OUTPATIENT
Start: 2024-02-24

## 2024-04-19 ENCOUNTER — TELEPHONE (OUTPATIENT)
Age: 49
End: 2024-04-19

## 2024-04-19 NOTE — TELEPHONE ENCOUNTER
"Patient states he woke up this morning and noticed a small \"blood spot\" on his L eye. No pain. Offered pt an appt for today but he states he cannot make it dye to working out of the area. Asking for advice.   "

## 2024-04-19 NOTE — TELEPHONE ENCOUNTER
Patient unable to connect virtually with video. Patient will keep appointment with Dr. Brush on Monday. Verbal instructions from Dr. Macias to encourage patient to use warm compresses 4x daily for 15 minutes each occurrence. Seek ER treatment for worsening symptom, eye pain, or vision impairment. Patient agreeable to plan.

## 2024-04-22 ENCOUNTER — OFFICE VISIT (OUTPATIENT)
Dept: FAMILY MEDICINE CLINIC | Facility: CLINIC | Age: 49
End: 2024-04-22
Payer: COMMERCIAL

## 2024-04-22 VITALS
RESPIRATION RATE: 22 BRPM | HEART RATE: 73 BPM | SYSTOLIC BLOOD PRESSURE: 138 MMHG | DIASTOLIC BLOOD PRESSURE: 72 MMHG | OXYGEN SATURATION: 99 % | TEMPERATURE: 96.7 F | BODY MASS INDEX: 27.99 KG/M2 | HEIGHT: 69 IN | WEIGHT: 189 LBS

## 2024-04-22 DIAGNOSIS — H11.32 SUBCONJUNCTIVAL HEMORRHAGE OF LEFT EYE: Primary | ICD-10-CM

## 2024-04-22 PROCEDURE — 99213 OFFICE O/P EST LOW 20 MIN: CPT | Performed by: FAMILY MEDICINE

## 2024-04-22 NOTE — PROGRESS NOTES
Assessment/Plan:       Problem List Items Addressed This Visit    None  Visit Diagnoses       Subconjunctival hemorrhage of left eye    -  Primary            1. Subconjunctival hemorrhage of left eye    Asymptomatic, no vision changes, or pain, 20/20 left eye, right eye 20/50 (chronic) warm compress, artifical tears follow up if needed.      Subjective:      Patient ID: Bryson Acuña is a 48 y.o. male.    HPI    48 year old pressenting for red spot in left eye, no vision changes, no pain.    No injury.    Never happened before.    Chronic vision changes in right eye, uses readers thinking of getting prescription glasses.    The following portions of the patient's history were reviewed and updated as appropriate: allergies, current medications, past family history, past medical history, past social history, past surgical history and problem list.      Current Outpatient Medications:     gabapentin (NEURONTIN) 100 mg capsule, TAKE 1 CAPSULE BY MOUTH TWICE A DAY *MAY INCREASE TO 3 TIMES A DAY IF NEEDED*, Disp: 180 capsule, Rfl: 0    meloxicam (Mobic) 15 mg tablet, Take 1 tablet (15 mg total) by mouth daily as needed (pain), Disp: 30 tablet, Rfl: 2    methocarbamol (Robaxin-750) 750 mg tablet, 1 tab TID prn, Disp: 90 tablet, Rfl: 1    NON FORMULARY, Uses medical cannabis in PM, Disp: , Rfl:     venlafaxine (EFFEXOR-XR) 75 mg 24 hr capsule, TAKE 1 CAPSULE BY MOUTH EVERY DAY WITH FOOD DO NOT TAKE WITH SKELAXIN CAUTION WITH MEDICAL MARIJUANA, Disp: 90 capsule, Rfl: 3    acetaminophen (TYLENOL) 325 mg tablet, Take 650 mg by mouth every 6 (six) hours as needed for mild pain  (Patient not taking: Reported on 4/27/2023), Disp: , Rfl:      Review of Systems   Constitutional:  Negative for activity change and appetite change.   Respiratory:  Negative for apnea and chest tightness.    Cardiovascular:  Negative for chest pain and palpitations.   Gastrointestinal:  Negative for abdominal distention and abdominal pain.  "  Musculoskeletal:  Negative for arthralgias and back pain.         Objective:      /72 (BP Location: Left arm, Patient Position: Sitting, Cuff Size: Standard)   Pulse 73   Temp (!) 96.7 °F (35.9 °C) (Tympanic)   Resp 22   Ht 5' 9\" (1.753 m)   Wt 85.7 kg (189 lb)   SpO2 99%   BMI 27.91 kg/m²          Physical Exam  Constitutional:       Appearance: Normal appearance.   Eyes:      General: Lids are normal. Lids are everted, no foreign bodies appreciated. Vision grossly intact.         Right eye: No foreign body or discharge.         Left eye: No foreign body or discharge.      Extraocular Movements:      Right eye: Normal extraocular motion.      Left eye: Normal extraocular motion.      Conjunctiva/sclera:      Left eye: Left conjunctiva is not injected. Hemorrhage present. No chemosis.     Pupils: Pupils are equal, round, and reactive to light.   Cardiovascular:      Rate and Rhythm: Normal rate and regular rhythm.      Pulses: Normal pulses.      Heart sounds: Normal heart sounds.   Pulmonary:      Effort: Pulmonary effort is normal.      Breath sounds: Normal breath sounds.   Abdominal:      General: Abdomen is flat.      Palpations: Abdomen is soft.   Musculoskeletal:         General: Normal range of motion.   Neurological:      General: No focal deficit present.      Mental Status: He is alert and oriented to person, place, and time.           Armond Brush MD  "

## 2024-05-14 DIAGNOSIS — F41.9 ANXIETY: ICD-10-CM

## 2024-05-14 RX ORDER — VENLAFAXINE HYDROCHLORIDE 75 MG/1
CAPSULE, EXTENDED RELEASE ORAL
Qty: 90 CAPSULE | Refills: 1 | Status: SHIPPED | OUTPATIENT
Start: 2024-05-14

## 2024-07-09 DIAGNOSIS — F41.9 ANXIETY: ICD-10-CM

## 2024-07-09 RX ORDER — VENLAFAXINE HYDROCHLORIDE 75 MG/1
CAPSULE, EXTENDED RELEASE ORAL
Qty: 90 CAPSULE | Refills: 0 | Status: CANCELLED | OUTPATIENT
Start: 2024-07-09

## 2024-07-10 DIAGNOSIS — G44.86 CERVICOGENIC HEADACHE: ICD-10-CM

## 2024-07-10 DIAGNOSIS — M47.812 CERVICAL SPONDYLOSIS WITHOUT MYELOPATHY: ICD-10-CM

## 2024-07-10 DIAGNOSIS — M54.81 OCCIPITAL NEURALGIA, UNSPECIFIED LATERALITY: ICD-10-CM

## 2024-07-11 RX ORDER — GABAPENTIN 100 MG/1
CAPSULE ORAL
Qty: 30 CAPSULE | Refills: 0 | Status: SHIPPED | OUTPATIENT
Start: 2024-07-11

## 2024-08-06 DIAGNOSIS — M47.812 CERVICAL SPONDYLOSIS WITHOUT MYELOPATHY: ICD-10-CM

## 2024-08-06 DIAGNOSIS — M54.81 OCCIPITAL NEURALGIA, UNSPECIFIED LATERALITY: ICD-10-CM

## 2024-08-06 DIAGNOSIS — G44.86 CERVICOGENIC HEADACHE: ICD-10-CM

## 2024-08-07 RX ORDER — GABAPENTIN 100 MG/1
CAPSULE ORAL
Qty: 90 CAPSULE | Refills: 0 | Status: SHIPPED | OUTPATIENT
Start: 2024-08-07

## 2024-11-13 ENCOUNTER — OFFICE VISIT (OUTPATIENT)
Dept: FAMILY MEDICINE CLINIC | Facility: CLINIC | Age: 49
End: 2024-11-13
Payer: COMMERCIAL

## 2024-11-13 ENCOUNTER — APPOINTMENT (OUTPATIENT)
Dept: LAB | Facility: MEDICAL CENTER | Age: 49
End: 2024-11-13
Payer: COMMERCIAL

## 2024-11-13 VITALS
DIASTOLIC BLOOD PRESSURE: 76 MMHG | OXYGEN SATURATION: 96 % | TEMPERATURE: 97.6 F | RESPIRATION RATE: 16 BRPM | SYSTOLIC BLOOD PRESSURE: 120 MMHG | BODY MASS INDEX: 28.47 KG/M2 | HEIGHT: 69 IN | WEIGHT: 192.2 LBS | HEART RATE: 78 BPM

## 2024-11-13 DIAGNOSIS — E78.2 MIXED HYPERLIPIDEMIA: ICD-10-CM

## 2024-11-13 DIAGNOSIS — Z13.0 SCREENING FOR DEFICIENCY ANEMIA: ICD-10-CM

## 2024-11-13 DIAGNOSIS — Z13.89 SCREENING FOR HEMATURIA OR PROTEINURIA: ICD-10-CM

## 2024-11-13 DIAGNOSIS — Z12.5 SCREENING FOR PROSTATE CANCER: ICD-10-CM
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
## 2024-11-13 DIAGNOSIS — Z13.1 SCREENING FOR DIABETES MELLITUS: ICD-10-CM

## 2024-11-13 DIAGNOSIS — M47.812 CERVICAL SPONDYLOSIS WITHOUT MYELOPATHY: ICD-10-CM

## 2024-11-13 DIAGNOSIS — Z12.11 COLON CANCER SCREENING: ICD-10-CM

## 2024-11-13 DIAGNOSIS — G44.86 CERVICOGENIC HEADACHE: ICD-10-CM

## 2024-11-13 DIAGNOSIS — M54.81 OCCIPITAL NEURALGIA, UNSPECIFIED LATERALITY: ICD-10-CM

## 2024-11-13 DIAGNOSIS — Z13.29 SCREENING FOR HYPOTHYROIDISM: ICD-10-CM

## 2024-11-13 DIAGNOSIS — Z00.00 ANNUAL PHYSICAL EXAM: Primary | ICD-10-CM

## 2024-11-13 LAB
BACTERIA UR QL AUTO: NORMAL /HPF
BASOPHILS # BLD AUTO: 0.06 THOUSANDS/ÂΜL (ref 0–0.1)
BASOPHILS NFR BLD AUTO: 1 % (ref 0–1)
BILIRUB UR QL STRIP: NEGATIVE
CHOLEST SERPL-MCNC: 245 MG/DL (ref ?–200)
CLARITY UR: CLEAR
COLOR UR: ABNORMAL
EOSINOPHIL # BLD AUTO: 0.14 THOUSAND/ÂΜL (ref 0–0.61)
EOSINOPHIL NFR BLD AUTO: 2 % (ref 0–6)
ERYTHROCYTE [DISTWIDTH] IN BLOOD BY AUTOMATED COUNT: 12.6 % (ref 11.6–15.1)
EST. AVERAGE GLUCOSE BLD GHB EST-MCNC: 114 MG/DL
GLUCOSE UR STRIP-MCNC: NEGATIVE MG/DL
HBA1C MFR BLD: 5.6 %
HCT VFR BLD AUTO: 49.1 % (ref 36.5–49.3)
HDLC SERPL-MCNC: 54 MG/DL
HGB BLD-MCNC: 16.8 G/DL (ref 12–17)
HGB UR QL STRIP.AUTO: NEGATIVE
IMM GRANULOCYTES # BLD AUTO: 0.02 THOUSAND/UL (ref 0–0.2)
IMM GRANULOCYTES NFR BLD AUTO: 0 % (ref 0–2)
KETONES UR STRIP-MCNC: NEGATIVE MG/DL
LDLC SERPL CALC-MCNC: 176 MG/DL (ref 0–100)
LEUKOCYTE ESTERASE UR QL STRIP: NEGATIVE
LYMPHOCYTES # BLD AUTO: 2.62 THOUSANDS/ÂΜL (ref 0.6–4.47)
LYMPHOCYTES NFR BLD AUTO: 33 % (ref 14–44)
MCH RBC QN AUTO: 31.3 PG (ref 26.8–34.3)
MCHC RBC AUTO-ENTMCNC: 34.2 G/DL (ref 31.4–37.4)
MCV RBC AUTO: 91 FL (ref 82–98)
MONOCYTES # BLD AUTO: 0.52 THOUSAND/ÂΜL (ref 0.17–1.22)
MONOCYTES NFR BLD AUTO: 7 % (ref 4–12)
NEUTROPHILS # BLD AUTO: 4.5 THOUSANDS/ÂΜL (ref 1.85–7.62)
NEUTS SEG NFR BLD AUTO: 57 % (ref 43–75)
NITRITE UR QL STRIP: NEGATIVE
NON-SQ EPI CELLS URNS QL MICRO: NORMAL /HPF
NONHDLC SERPL-MCNC: 191 MG/DL
NRBC BLD AUTO-RTO: 0 /100 WBCS
PH UR STRIP.AUTO: 6.5 [PH]
PLATELET # BLD AUTO: 271 THOUSANDS/UL (ref 149–390)
PMV BLD AUTO: 10 FL (ref 8.9–12.7)
PROT UR STRIP-MCNC: ABNORMAL MG/DL
PSA SERPL-MCNC: 0.66 NG/ML (ref 0–4)
RBC # BLD AUTO: 5.37 MILLION/UL (ref 3.88–5.62)
RBC #/AREA URNS AUTO: NORMAL /HPF
SP GR UR STRIP.AUTO: 1.02 (ref 1–1.03)
TRIGL SERPL-MCNC: 74 MG/DL (ref ?–150)
TSH SERPL DL<=0.05 MIU/L-ACNC: 1.46 UIU/ML (ref 0.45–4.5)
UROBILINOGEN UR STRIP-ACNC: <2 MG/DL
WBC # BLD AUTO: 7.86 THOUSAND/UL (ref 4.31–10.16)
WBC #/AREA URNS AUTO: NORMAL /HPF

## 2024-11-13 PROCEDURE — 84443 ASSAY THYROID STIM HORMONE: CPT

## 2024-11-13 PROCEDURE — 99396 PREV VISIT EST AGE 40-64: CPT | Performed by: INTERNAL MEDICINE

## 2024-11-13 PROCEDURE — 80061 LIPID PANEL: CPT

## 2024-11-13 PROCEDURE — 99214 OFFICE O/P EST MOD 30 MIN: CPT | Performed by: INTERNAL MEDICINE

## 2024-11-13 PROCEDURE — 83036 HEMOGLOBIN GLYCOSYLATED A1C: CPT

## 2024-11-13 PROCEDURE — 85025 COMPLETE CBC W/AUTO DIFF WBC: CPT

## 2024-11-13 PROCEDURE — 81001 URINALYSIS AUTO W/SCOPE: CPT

## 2024-11-13 PROCEDURE — G0103 PSA SCREENING: HCPCS

## 2024-11-13 PROCEDURE — 36415 COLL VENOUS BLD VENIPUNCTURE: CPT

## 2024-11-13 RX ORDER — GABAPENTIN 100 MG/1
CAPSULE ORAL
Qty: 90 CAPSULE | Refills: 0 | Status: CANCELLED | OUTPATIENT
Start: 2024-11-13

## 2024-11-13 RX ORDER — MELOXICAM 15 MG/1
15 TABLET ORAL DAILY PRN
Qty: 30 TABLET | Refills: 2 | Status: SHIPPED | OUTPATIENT
Start: 2024-11-13

## 2024-11-13 RX ORDER — GABAPENTIN 300 MG/1
300 CAPSULE ORAL
Qty: 270 CAPSULE | Refills: 0 | Status: SHIPPED | OUTPATIENT
Start: 2024-11-13 | End: 2025-11-08

## 2024-11-13 RX ORDER — DULOXETIN HYDROCHLORIDE 30 MG/1
30 CAPSULE, DELAYED RELEASE ORAL DAILY
Qty: 14 CAPSULE | Refills: 0 | Status: SHIPPED | OUTPATIENT
Start: 2024-11-13

## 2024-11-13 RX ORDER — DULOXETIN HYDROCHLORIDE 60 MG/1
60 CAPSULE, DELAYED RELEASE ORAL DAILY
Qty: 90 CAPSULE | Refills: 0 | Status: SHIPPED | OUTPATIENT
Start: 2024-11-13

## 2024-11-13 NOTE — ASSESSMENT & PLAN NOTE
MRI from few years ago consistent with significant degenerative changes.  He will establish care with chiropractic care.  Tried physical therapy with no significant response.  Continue with gabapentin 300 mg daily at bedtime, side effects discussed.  Discussed with the patient that he may consider to increase it up to 600 mg daily at night.  Will switch his Effexor  to Cymbalta, start 30 mg of Cymbalta daily, discontinue Effexor.  Okay to increase dose up to 60 mg of Cymbalta in 2 weeks.  Update on his symptoms in few weeks.  Okay to use Mobic sparingly.

## 2024-11-13 NOTE — ASSESSMENT & PLAN NOTE
That was his cat who bit him to his face at home.  The cat behaves normal.  Start Augmentin 875 mg twice daily for 7 days.  Update if no improvement.

## 2024-11-13 NOTE — PATIENT INSTRUCTIONS
Please stop your fracture order, start duloxetine 30 mg daily for 14 days after that increase up to 60 mg daily.  Start gabapentin 300 mg daily at night just take 1 pill.  You may consider to increase it up to 2 pills daily at night if you feel need for that.    You can take Mobic daily as needed for exacerbations.    Start fish oil 4000 mg every day.     Start turmeric supplements 1500 mg every day for your aches and pains.

## 2024-11-13 NOTE — PROGRESS NOTES
Assessment/Plan:    Cervical spondylosis without myelopathy  MRI from few years ago consistent with significant degenerative changes.  He will establish care with chiropractic care.  Tried physical therapy with no significant response.  Continue with gabapentin 300 mg daily at bedtime, side effects discussed.  Discussed with the patient that he may consider to increase it up to 600 mg daily at night.  Will switch his Effexor  to Cymbalta, start 30 mg of Cymbalta daily, discontinue Effexor.  Okay to increase dose up to 60 mg of Cymbalta in 2 weeks.  Update on his symptoms in few weeks.  Okay to use Mobic sparingly.    Mixed hyperlipidemia  Recheck lipid panel, hold off on therapy as for now.    Cat bite  That was his cat who bit him to his face at home.  The cat behaves normal.  Start Augmentin 875 mg twice daily for 7 days.  Update if no improvement.       Diagnoses and all orders for this visit:    Annual physical exam    Cervical spondylosis without myelopathy  -     meloxicam (Mobic) 15 mg tablet; Take 1 tablet (15 mg total) by mouth daily as needed (pain)  -     gabapentin (NEURONTIN) 300 mg capsule; Take 1 capsule (300 mg total) by mouth daily at bedtime Take 300 mg daily at night, you may try to increase dose up to 600 mg at night in 2 weeks.  -     DULoxetine (CYMBALTA) 60 mg delayed release capsule; Take 1 capsule (60 mg total) by mouth daily Start after finishing with 30 mg daily.  -     DULoxetine (CYMBALTA) 30 mg delayed release capsule; Take 1 capsule (30 mg total) by mouth daily    Cervicogenic headache    Occipital neuralgia    Colon cancer screening  -     Ambulatory Referral to Gastroenterology; Future    Mixed hyperlipidemia  -     Lipid panel; Future    Screening for diabetes mellitus  -     Hemoglobin A1C; Future    Screening for prostate cancer  -     PSA, Total Screen; Future    Screening for hematuria or proteinuria  -     UA (URINE) with reflex to Scope; Future    Screening for deficiency  "anemia  -     CBC and differential; Future    Screening for hypothyroidism  -     TSH, 3rd generation with Free T4 reflex; Future    Cat bite, initial encounter  -     amoxicillin-clavulanate (AUGMENTIN) 875-125 mg per tablet; Take 1 tablet by mouth every 12 (twelve) hours for 7 days          Subjective:      Patient ID: Bryson Acuña is a 49 y.o. male.    Patient came today for annual checkup and to follow-up on his chronic medical problems.  He is up-to-date on tetanus shot, would like to hold off on flu shot.  Agreed for PSA and colonoscopy.  He does not smoke.  He is very physically active.          The following portions of the patient's history were reviewed and updated as appropriate: allergies, current medications, past family history, past medical history, past social history, past surgical history, and problem list.    Review of Systems   Constitutional:  Negative for chills, fatigue and fever.   Respiratory:  Negative for cough, chest tightness and shortness of breath.    Cardiovascular:  Negative for chest pain, palpitations and leg swelling.   Gastrointestinal:  Negative for abdominal distention, abdominal pain, blood in stool, constipation, diarrhea and nausea.   Genitourinary:  Negative for difficulty urinating and dysuria.   Musculoskeletal:  Positive for arthralgias, myalgias, neck pain and neck stiffness. Negative for back pain, gait problem and joint swelling.   Skin:  Negative for rash.   Neurological:  Negative for dizziness, weakness, numbness and headaches.   Psychiatric/Behavioral:  Negative for agitation.          Objective:      /76 (BP Location: Left arm, Patient Position: Sitting, Cuff Size: Large)   Pulse 78   Temp 97.6 °F (36.4 °C) (Temporal)   Resp 16   Ht 5' 9\" (1.753 m)   Wt 87.2 kg (192 lb 3.2 oz)   SpO2 96%   BMI 28.38 kg/m²     Allergies   Allergen Reactions    Bee Venom Hives          Current Outpatient Medications:     acetaminophen (TYLENOL) 325 mg tablet, Take " 650 mg by mouth every 6 (six) hours as needed for mild pain, Disp: , Rfl:     amoxicillin-clavulanate (AUGMENTIN) 875-125 mg per tablet, Take 1 tablet by mouth every 12 (twelve) hours for 7 days, Disp: 14 tablet, Rfl: 0    DULoxetine (CYMBALTA) 30 mg delayed release capsule, Take 1 capsule (30 mg total) by mouth daily, Disp: 14 capsule, Rfl: 0    DULoxetine (CYMBALTA) 60 mg delayed release capsule, Take 1 capsule (60 mg total) by mouth daily Start after finishing with 30 mg daily., Disp: 90 capsule, Rfl: 0    gabapentin (NEURONTIN) 300 mg capsule, Take 1 capsule (300 mg total) by mouth daily at bedtime Take 300 mg daily at night, you may try to increase dose up to 600 mg at night in 2 weeks., Disp: 270 capsule, Rfl: 0    meloxicam (Mobic) 15 mg tablet, Take 1 tablet (15 mg total) by mouth daily as needed (pain), Disp: 30 tablet, Rfl: 2    NON FORMULARY, Uses medical cannabis in PM, Disp: , Rfl:     methocarbamol (Robaxin-750) 750 mg tablet, 1 tab TID prn (Patient not taking: Reported on 11/13/2024), Disp: 90 tablet, Rfl: 1     Patient Instructions   Please stop your fracture order, start duloxetine 30 mg daily for 14 days after that increase up to 60 mg daily.  Start gabapentin 300 mg daily at night just take 1 pill.  You may consider to increase it up to 2 pills daily at night if you feel need for that.    You can take Mobic daily as needed for exacerbations.    Start fish oil 4000 mg every day.     Start turmeric supplements 1500 mg every day for your aches and pains.            Physical Exam  Constitutional:       General: He is not in acute distress.     Appearance: He is not ill-appearing or toxic-appearing.   Cardiovascular:      Rate and Rhythm: Normal rate.      Heart sounds: No murmur heard.     No gallop.   Pulmonary:      Effort: No respiratory distress.      Breath sounds: No wheezing or rales.   Musculoskeletal:         General: Tenderness present. No swelling or deformity.   Skin:     Comments: Small  skin excoriation of the left cheek due to cat bite   Psychiatric:         Mood and Affect: Mood normal.         Behavior: Behavior normal.

## 2024-11-14 ENCOUNTER — RESULTS FOLLOW-UP (OUTPATIENT)
Dept: FAMILY MEDICINE CLINIC | Facility: CLINIC | Age: 49
End: 2024-11-14

## 2024-11-22 ENCOUNTER — TELEPHONE (OUTPATIENT)
Age: 49
End: 2024-11-22

## 2024-11-22 ENCOUNTER — PREP FOR PROCEDURE (OUTPATIENT)
Age: 49
End: 2024-11-22

## 2024-11-22 DIAGNOSIS — Z12.11 SPECIAL SCREENING FOR MALIGNANT NEOPLASMS, COLON: Primary | ICD-10-CM

## 2024-11-22 NOTE — TELEPHONE ENCOUNTER
Scheduled date of colonoscopy (as of today): 12/16/2024  Physician performing colonoscopy: DR DENIS  Location of colonoscopy: [facility]  Bowel prep reviewed with patient: MIRALAX/DULCOLAX  Instructions reviewed with patient by: Radha via telephone. Procedure directions send via [employer].   Clearances: n/a

## 2024-11-22 NOTE — TELEPHONE ENCOUNTER
11/22/24  Screened by: Radha Fry MA    Referring Provider pcp    Pre- Screening:     There is no height or weight on file to calculate BMI.  Has patient been referred for a routine screening Colonoscopy? yes  Is the patient between 45-75 years old? yes      Previous Colonoscopy no   If yes:    Date:     Facility:     Reason:           Does the patient want to see a Gastroenterologist prior to their procedure OR are they having any GI symptoms? no    Has the patient been hospitalized or had abdominal surgery in the past 6 months? no    Does the patient use supplemental oxygen? no    Does the patient take Coumadin, Lovenox, Plavix, Elliquis, Xarelto, or other blood thinning medication? no    Has the patient had a stroke, cardiac event, or stent placed in the past year? no        If patient is between 45yrs - 49yrs, please advise patient that we will have to confirm benefits & coverage with their insurance company for a routine screening colonoscopy.

## 2024-11-25 ENCOUNTER — PATIENT MESSAGE (OUTPATIENT)
Dept: GASTROENTEROLOGY | Facility: CLINIC | Age: 49
End: 2024-11-25

## 2024-12-02 ENCOUNTER — ANESTHESIA (OUTPATIENT)
Dept: ANESTHESIOLOGY | Facility: HOSPITAL | Age: 49
End: 2024-12-02

## 2024-12-02 ENCOUNTER — ANESTHESIA EVENT (OUTPATIENT)
Dept: ANESTHESIOLOGY | Facility: HOSPITAL | Age: 49
End: 2024-12-02

## 2024-12-14 ENCOUNTER — NURSE TRIAGE (OUTPATIENT)
Dept: OTHER | Facility: OTHER | Age: 49
End: 2024-12-14

## 2024-12-14 NOTE — TELEPHONE ENCOUNTER
"Regarding: colonoscopy prep questions  ----- Message from Marly FLORES sent at 12/14/2024  3:02 PM EST -----  Pt stated, \"I have questions about my colonoscopy prep.\"    "

## 2024-12-16 ENCOUNTER — ANESTHESIA EVENT (OUTPATIENT)
Dept: GASTROENTEROLOGY | Facility: MEDICAL CENTER | Age: 49
End: 2024-12-16
Payer: COMMERCIAL

## 2024-12-16 ENCOUNTER — HOSPITAL ENCOUNTER (OUTPATIENT)
Dept: GASTROENTEROLOGY | Facility: MEDICAL CENTER | Age: 49
Setting detail: OUTPATIENT SURGERY
Discharge: HOME | End: 2024-12-16
Payer: COMMERCIAL

## 2024-12-16 ENCOUNTER — ANESTHESIA (OUTPATIENT)
Dept: GASTROENTEROLOGY | Facility: MEDICAL CENTER | Age: 49
End: 2024-12-16
Payer: COMMERCIAL

## 2024-12-16 VITALS
DIASTOLIC BLOOD PRESSURE: 86 MMHG | TEMPERATURE: 97.3 F | SYSTOLIC BLOOD PRESSURE: 153 MMHG | OXYGEN SATURATION: 98 % | RESPIRATION RATE: 20 BRPM | HEART RATE: 72 BPM

## 2024-12-16 DIAGNOSIS — Z12.11 SPECIAL SCREENING FOR MALIGNANT NEOPLASMS, COLON: ICD-10-CM

## 2024-12-16 PROCEDURE — 45385 COLONOSCOPY W/LESION REMOVAL: CPT | Performed by: INTERNAL MEDICINE

## 2024-12-16 PROCEDURE — 88305 TISSUE EXAM BY PATHOLOGIST: CPT | Performed by: PATHOLOGY

## 2024-12-16 RX ORDER — PROPOFOL 10 MG/ML
INJECTION, EMULSION INTRAVENOUS CONTINUOUS PRN
Status: DISCONTINUED | OUTPATIENT
Start: 2024-12-16 | End: 2024-12-16

## 2024-12-16 RX ORDER — SODIUM CHLORIDE 9 MG/ML
125 INJECTION, SOLUTION INTRAVENOUS CONTINUOUS
Status: DISCONTINUED | OUTPATIENT
Start: 2024-12-16 | End: 2024-12-20 | Stop reason: HOSPADM

## 2024-12-16 RX ORDER — PROPOFOL 10 MG/ML
INJECTION, EMULSION INTRAVENOUS AS NEEDED
Status: DISCONTINUED | OUTPATIENT
Start: 2024-12-16 | End: 2024-12-16

## 2024-12-16 RX ORDER — LIDOCAINE HYDROCHLORIDE 20 MG/ML
INJECTION, SOLUTION EPIDURAL; INFILTRATION; INTRACAUDAL; PERINEURAL AS NEEDED
Status: DISCONTINUED | OUTPATIENT
Start: 2024-12-16 | End: 2024-12-16

## 2024-12-16 RX ORDER — SODIUM CHLORIDE 9 MG/ML
125 INJECTION, SOLUTION INTRAVENOUS CONTINUOUS
Status: CANCELLED | OUTPATIENT
Start: 2024-12-16

## 2024-12-16 RX ADMIN — Medication 40 MG: at 09:07

## 2024-12-16 RX ADMIN — PROPOFOL 50 MG: 10 INJECTION, EMULSION INTRAVENOUS at 09:06

## 2024-12-16 RX ADMIN — LIDOCAINE HYDROCHLORIDE 100 MG: 20 INJECTION, SOLUTION EPIDURAL; INFILTRATION; INTRACAUDAL at 08:59

## 2024-12-16 RX ADMIN — SODIUM CHLORIDE 125 ML/HR: 0.9 INJECTION, SOLUTION INTRAVENOUS at 08:44

## 2024-12-16 RX ADMIN — PROPOFOL 150 MG: 10 INJECTION, EMULSION INTRAVENOUS at 08:59

## 2024-12-16 RX ADMIN — PROPOFOL 100 MCG/KG/MIN: 10 INJECTION, EMULSION INTRAVENOUS at 09:03

## 2024-12-16 RX ADMIN — PROPOFOL 30 MG: 10 INJECTION, EMULSION INTRAVENOUS at 09:01

## 2024-12-16 NOTE — ANESTHESIA PREPROCEDURE EVALUATION
"Procedure:  COLONOSCOPY     - denies any chest pain, palpitations, shortness of breath, syncope, lightheadedness, seizures   - denies any recent infectious symptoms such as fevers, chills, cough   - denies taking any anticoagulation medications or any issues with bleeding, bruising, clotting    Relevant Problems   ANESTHESIA (within normal limits)      CARDIO   (+) Hypercholesterolemia   (+) Mixed hyperlipidemia      ENDO (within normal limits)      GI/HEPATIC (within normal limits)      /RENAL (within normal limits)      GYN (within normal limits)      HEMATOLOGY (within normal limits)      MUSCULOSKELETAL   (+) Cervical spondylosis without myelopathy      NEURO/PSYCH   (+) Anxiety   (+) Headache   (+) Occipital neuralgia of left side      PULMONARY (within normal limits)      Lab Results   Component Value Date    WBC 7.86 11/13/2024    HGB 16.8 11/13/2024    HCT 49.1 11/13/2024    MCV 91 11/13/2024     11/13/2024     Lab Results   Component Value Date    SODIUM 138 04/27/2023    K 4.4 04/27/2023     (H) 04/27/2023    CO2 28 04/27/2023    AGAP 0 (L) 04/27/2023    BUN 17 04/27/2023    CREATININE 1.13 04/27/2023    GLUF 99 04/27/2023    CALCIUM 8.9 04/27/2023    AST 21 04/27/2023    ALT 26 04/27/2023    ALKPHOS 82 04/27/2023    TP 7.3 04/27/2023    TBILI 0.31 04/27/2023    EGFR 76 04/27/2023     No results found for: \"PTT\"  No results found for: \"INR\", \"PROTIME\"    Physical Exam    Airway    Mallampati score: II  TM Distance: >3 FB  Neck ROM: full     Dental       Cardiovascular  Rhythm: regular, Rate: normal, Cardiovascular exam normal    Pulmonary  Pulmonary exam normal Breath sounds clear to auscultation    Other Findings        Anesthesia Plan  ASA Score- 2     Anesthesia Type- IV sedation with anesthesia with ASA Monitors.         Additional Monitors:     Airway Plan:            Plan Factors-Exercise tolerance (METS): >4 METS.    Chart reviewed. EKG reviewed.  Existing labs reviewed. Patient " summary reviewed.          Obstructive sleep apnea risk education given perioperatively.        Induction- intravenous.    Postoperative Plan-         Informed Consent- Anesthetic plan and risks discussed with patient.  I personally reviewed this patient with the CRNA. Discussed and agreed on the Anesthesia Plan with the CRNA..

## 2024-12-16 NOTE — ANESTHESIA POSTPROCEDURE EVALUATION
Post-Op Assessment Note    CV Status:  Stable  Pain Score: 0    Pain management: adequate       Mental Status:  Sleepy and arousable   Hydration Status:  Euvolemic   PONV Controlled:  Controlled   Airway Patency:  Patent     Post Op Vitals Reviewed: Yes    No anethesia notable event occurred.    Staff: Anesthesiologist, CRNA           Last Filed PACU Vitals:  Vitals Value Taken Time   Temp     Pulse 76    /75    Resp 20    SpO2 99        Modified Mt:  Activity: 2 (12/16/2024  8:44 AM)  Respiration: 2 (12/16/2024  8:44 AM)  Circulation: 2 (12/16/2024  8:44 AM)  Consciousness: 2 (12/16/2024  8:44 AM)  Oxygen Saturation: 2 (12/16/2024  8:44 AM)  Modified Mt Score: 10 (12/16/2024  8:44 AM)         no

## 2024-12-16 NOTE — H&P
History and Physical -  [facility]  Bryson Acuña 49 y.o. male MRN: [id number]                  HPI: Bryson Acuña is a 49 y.o. year old male who presents for colonoscopy for colon cancer screening.      REVIEW OF SYSTEMS: Per the HPI, and otherwise unremarkable.    Historical Information   Past Medical History:   Diagnosis Date    Anxiety     Arthritis     Cervical radiculopathy 7/15/2021    Referred to pain management in July 2021 - and will be getting nerve root injections with possible ablation / NAGA    Neck pain      Past Surgical History:   Procedure Laterality Date    NO PAST SURGERIES       Social History   Social History     Substance and Sexual Activity   Alcohol Use Yes     Social History     Substance and Sexual Activity   Drug Use Yes    Types: Marijuana    Comment: medical     Social History     Tobacco Use   Smoking Status Former    Types: Cigarettes   Smokeless Tobacco Never     Family History   Problem Relation Age of Onset    No Known Problems Mother     No Known Problems Father        Meds/Allergies     Not in a hospital admission.    Allergies   Allergen Reactions    Bee Venom Hives       Objective     Blood pressure 138/88, pulse 72, temperature (!) 97.3 °F (36.3 °C), resp. rate 18, SpO2 98%.      PHYSICAL EXAM    Gen: NAD  CV: RRR  CHEST: Clear  ABD: soft, NT/ND  EXT: no edema      ASSESSMENT/PLAN:  Bryson Acuña is a 49 y.o. year old male who presents for colonoscopy for colon cancer screening. The patient is stable and optimized for the procedure, we reviewed risk and benefits. Risk include but not limited to infection, bleeding, perforation and missing a lesion.

## 2024-12-19 PROCEDURE — 88305 TISSUE EXAM BY PATHOLOGIST: CPT | Performed by: PATHOLOGY

## 2024-12-22 ENCOUNTER — RESULTS FOLLOW-UP (OUTPATIENT)
Dept: GASTROENTEROLOGY | Facility: CLINIC | Age: 49
End: 2024-12-22

## 2024-12-27 ENCOUNTER — TELEPHONE (OUTPATIENT)
Dept: GASTROENTEROLOGY | Facility: CLINIC | Age: 49
End: 2024-12-27

## 2025-02-01 DIAGNOSIS — M47.812 CERVICAL SPONDYLOSIS WITHOUT MYELOPATHY: ICD-10-CM

## 2025-02-03 DIAGNOSIS — M47.812 CERVICAL SPONDYLOSIS WITHOUT MYELOPATHY: ICD-10-CM

## 2025-02-03 RX ORDER — GABAPENTIN 300 MG/1
CAPSULE ORAL
Qty: 60 CAPSULE | Refills: 0 | Status: SHIPPED | OUTPATIENT
Start: 2025-02-03

## 2025-02-04 RX ORDER — DULOXETIN HYDROCHLORIDE 60 MG/1
60 CAPSULE, DELAYED RELEASE ORAL DAILY
Qty: 90 CAPSULE | Refills: 1 | Status: SHIPPED | OUTPATIENT
Start: 2025-02-04

## 2025-07-08 DIAGNOSIS — M47.812 CERVICAL SPONDYLOSIS WITHOUT MYELOPATHY: ICD-10-CM

## 2025-07-10 RX ORDER — GABAPENTIN 300 MG/1
CAPSULE ORAL
Qty: 60 CAPSULE | Refills: 0 | OUTPATIENT
Start: 2025-07-10

## 2025-07-14 ENCOUNTER — TELEPHONE (OUTPATIENT)
Dept: FAMILY MEDICINE CLINIC | Facility: CLINIC | Age: 50
End: 2025-07-14

## 2025-07-14 NOTE — TELEPHONE ENCOUNTER
Patient called the RX Refill Line. Message is being forwarded to the office.     Patient is requesting a call back.  His gabapentin were refused due to needing blood work but there is no blood work ordered in [facility].  He is also due for an appointment.  Please call the patient to schedule an appointment and see if he can get a courtesy refill in the meantime.     Please contact patient at [phone]

## 2025-07-15 DIAGNOSIS — M47.812 CERVICAL SPONDYLOSIS WITHOUT MYELOPATHY: ICD-10-CM

## 2025-07-15 RX ORDER — GABAPENTIN 300 MG/1
300 CAPSULE ORAL
Qty: 60 CAPSULE | Refills: 0 | Status: SHIPPED | OUTPATIENT
Start: 2025-07-15

## 2025-07-23 ENCOUNTER — APPOINTMENT (OUTPATIENT)
Dept: LAB | Facility: MEDICAL CENTER | Age: 50
End: 2025-07-23
Attending: INTERNAL MEDICINE
Payer: COMMERCIAL

## 2025-07-23 DIAGNOSIS — M47.812 CERVICAL SPONDYLOSIS WITHOUT MYELOPATHY: ICD-10-CM

## 2025-07-23 LAB
ANION GAP SERPL CALCULATED.3IONS-SCNC: 8 MMOL/L (ref 4–13)
BUN SERPL-MCNC: 18 MG/DL (ref 5–25)
CALCIUM SERPL-MCNC: 9 MG/DL (ref 8.4–10.2)
CHLORIDE SERPL-SCNC: 105 MMOL/L (ref 96–108)
CO2 SERPL-SCNC: 28 MMOL/L (ref 21–32)
CREAT SERPL-MCNC: 0.95 MG/DL (ref 0.6–1.3)
GFR SERPL CREATININE-BSD FRML MDRD: 92 ML/MIN/1.73SQ M
GLUCOSE SERPL-MCNC: 98 MG/DL (ref 65–140)
POTASSIUM SERPL-SCNC: 4.4 MMOL/L (ref 3.5–5.3)
SODIUM SERPL-SCNC: 141 MMOL/L (ref 135–147)

## 2025-07-23 PROCEDURE — 36415 COLL VENOUS BLD VENIPUNCTURE: CPT

## 2025-07-23 PROCEDURE — 80048 BASIC METABOLIC PNL TOTAL CA: CPT
